# Patient Record
Sex: FEMALE | Race: WHITE | NOT HISPANIC OR LATINO | URBAN - METROPOLITAN AREA
[De-identification: names, ages, dates, MRNs, and addresses within clinical notes are randomized per-mention and may not be internally consistent; named-entity substitution may affect disease eponyms.]

---

## 2017-02-12 ENCOUNTER — EMERGENCY (EMERGENCY)
Facility: HOSPITAL | Age: 82
LOS: 1 days | Discharge: HOME | End: 2017-02-12
Attending: EMERGENCY MEDICINE | Admitting: EMERGENCY MEDICINE
Payer: MEDICARE

## 2017-02-12 VITALS
OXYGEN SATURATION: 99 % | RESPIRATION RATE: 18 BRPM | SYSTOLIC BLOOD PRESSURE: 152 MMHG | HEART RATE: 94 BPM | TEMPERATURE: 98 F | DIASTOLIC BLOOD PRESSURE: 93 MMHG

## 2017-02-12 VITALS
DIASTOLIC BLOOD PRESSURE: 78 MMHG | TEMPERATURE: 98 F | OXYGEN SATURATION: 100 % | HEART RATE: 94 BPM | SYSTOLIC BLOOD PRESSURE: 156 MMHG | RESPIRATION RATE: 18 BRPM

## 2017-02-12 LAB
ALBUMIN SERPL ELPH-MCNC: 4.1 G/DL — SIGNIFICANT CHANGE UP (ref 3.3–5)
ALP SERPL-CCNC: 66 U/L — SIGNIFICANT CHANGE UP (ref 40–120)
ALT FLD-CCNC: 14 U/L — SIGNIFICANT CHANGE UP (ref 4–33)
AMORPH CRY # UR COMP ASSIST: SIGNIFICANT CHANGE UP (ref 0–0)
APPEARANCE UR: SIGNIFICANT CHANGE UP
APTT BLD: 50.5 SEC — HIGH (ref 27.5–37.4)
AST SERPL-CCNC: 30 U/L — SIGNIFICANT CHANGE UP (ref 4–32)
BACTERIA # UR AUTO: SIGNIFICANT CHANGE UP
BASOPHILS # BLD AUTO: 0.01 K/UL — SIGNIFICANT CHANGE UP (ref 0–0.2)
BASOPHILS NFR BLD AUTO: 0.1 % — SIGNIFICANT CHANGE UP (ref 0–2)
BILIRUB SERPL-MCNC: 0.6 MG/DL — SIGNIFICANT CHANGE UP (ref 0.2–1.2)
BILIRUB UR-MCNC: NEGATIVE — SIGNIFICANT CHANGE UP
BLD GP AB SCN SERPL QL: NEGATIVE — SIGNIFICANT CHANGE UP
BLOOD UR QL VISUAL: NEGATIVE — SIGNIFICANT CHANGE UP
BUN SERPL-MCNC: 13 MG/DL — SIGNIFICANT CHANGE UP (ref 7–23)
CALCIUM SERPL-MCNC: 8.8 MG/DL — SIGNIFICANT CHANGE UP (ref 8.4–10.5)
CHLORIDE SERPL-SCNC: 103 MMOL/L — SIGNIFICANT CHANGE UP (ref 98–107)
CO2 SERPL-SCNC: 19 MMOL/L — LOW (ref 22–31)
COLOR SPEC: SIGNIFICANT CHANGE UP
CREAT SERPL-MCNC: 0.83 MG/DL — SIGNIFICANT CHANGE UP (ref 0.5–1.3)
EOSINOPHIL # BLD AUTO: 0.04 K/UL — SIGNIFICANT CHANGE UP (ref 0–0.5)
EOSINOPHIL NFR BLD AUTO: 0.5 % — SIGNIFICANT CHANGE UP (ref 0–6)
GLUCOSE SERPL-MCNC: 164 MG/DL — HIGH (ref 70–99)
GLUCOSE UR-MCNC: NEGATIVE — SIGNIFICANT CHANGE UP
HCT VFR BLD CALC: 40.5 % — SIGNIFICANT CHANGE UP (ref 34.5–45)
HGB BLD-MCNC: 13.7 G/DL — SIGNIFICANT CHANGE UP (ref 11.5–15.5)
HYALINE CASTS # UR AUTO: SIGNIFICANT CHANGE UP (ref 0–?)
IMM GRANULOCYTES NFR BLD AUTO: 0.3 % — SIGNIFICANT CHANGE UP (ref 0–1.5)
INR BLD: 1.27 — HIGH (ref 0.87–1.18)
KETONES UR-MCNC: NEGATIVE — SIGNIFICANT CHANGE UP
LEUKOCYTE ESTERASE UR-ACNC: HIGH
LYMPHOCYTES # BLD AUTO: 0.89 K/UL — LOW (ref 1–3.3)
LYMPHOCYTES # BLD AUTO: 12 % — LOW (ref 13–44)
MCHC RBC-ENTMCNC: 30.3 PG — SIGNIFICANT CHANGE UP (ref 27–34)
MCHC RBC-ENTMCNC: 33.8 % — SIGNIFICANT CHANGE UP (ref 32–36)
MCV RBC AUTO: 89.6 FL — SIGNIFICANT CHANGE UP (ref 80–100)
MONOCYTES # BLD AUTO: 0.61 K/UL — SIGNIFICANT CHANGE UP (ref 0–0.9)
MONOCYTES NFR BLD AUTO: 8.2 % — SIGNIFICANT CHANGE UP (ref 2–14)
MUCOUS THREADS # UR AUTO: SIGNIFICANT CHANGE UP
NEUTROPHILS # BLD AUTO: 5.83 K/UL — SIGNIFICANT CHANGE UP (ref 1.8–7.4)
NEUTROPHILS NFR BLD AUTO: 78.9 % — HIGH (ref 43–77)
NITRITE UR-MCNC: NEGATIVE — SIGNIFICANT CHANGE UP
NON-SQ EPI CELLS # UR AUTO: <1 — SIGNIFICANT CHANGE UP
PH UR: 6 — SIGNIFICANT CHANGE UP (ref 4.6–8)
PLATELET # BLD AUTO: 176 K/UL — SIGNIFICANT CHANGE UP (ref 150–400)
PMV BLD: 10.5 FL — SIGNIFICANT CHANGE UP (ref 7–13)
POTASSIUM SERPL-MCNC: 5.4 MMOL/L — HIGH (ref 3.5–5.3)
POTASSIUM SERPL-SCNC: 5.4 MMOL/L — HIGH (ref 3.5–5.3)
PROT SERPL-MCNC: 6.7 G/DL — SIGNIFICANT CHANGE UP (ref 6–8.3)
PROT UR-MCNC: 10 — SIGNIFICANT CHANGE UP
PROTHROM AB SERPL-ACNC: 14.5 SEC — HIGH (ref 10–13.1)
RBC # BLD: 4.52 M/UL — SIGNIFICANT CHANGE UP (ref 3.8–5.2)
RBC # FLD: 12.9 % — SIGNIFICANT CHANGE UP (ref 10.3–14.5)
RBC CASTS # UR COMP ASSIST: SIGNIFICANT CHANGE UP (ref 0–?)
RH IG SCN BLD-IMP: POSITIVE — SIGNIFICANT CHANGE UP
SODIUM SERPL-SCNC: 140 MMOL/L — SIGNIFICANT CHANGE UP (ref 135–145)
SP GR SPEC: 1.01 — SIGNIFICANT CHANGE UP (ref 1–1.03)
SQUAMOUS # UR AUTO: SIGNIFICANT CHANGE UP
TROPONIN T SERPL-MCNC: < 0.06 NG/ML — SIGNIFICANT CHANGE UP (ref 0–0.06)
UROBILINOGEN FLD QL: NORMAL E.U. — SIGNIFICANT CHANGE UP (ref 0.1–0.2)
WBC # BLD: 7.4 K/UL — SIGNIFICANT CHANGE UP (ref 3.8–10.5)
WBC # FLD AUTO: 7.4 K/UL — SIGNIFICANT CHANGE UP (ref 3.8–10.5)
WBC UR QL: SIGNIFICANT CHANGE UP (ref 0–?)
YEAST BUDDING # UR COMP ASSIST: SIGNIFICANT CHANGE UP

## 2017-02-12 PROCEDURE — 99284 EMERGENCY DEPT VISIT MOD MDM: CPT | Mod: 25,GC

## 2017-02-12 PROCEDURE — 70450 CT HEAD/BRAIN W/O DYE: CPT | Mod: 26

## 2017-02-12 PROCEDURE — 93010 ELECTROCARDIOGRAM REPORT: CPT

## 2017-02-12 PROCEDURE — 71010: CPT | Mod: 26

## 2017-02-12 RX ORDER — SODIUM CHLORIDE 9 MG/ML
1000 INJECTION INTRAMUSCULAR; INTRAVENOUS; SUBCUTANEOUS ONCE
Qty: 0 | Refills: 0 | Status: COMPLETED | OUTPATIENT
Start: 2017-02-12 | End: 2017-02-12

## 2017-02-12 RX ADMIN — SODIUM CHLORIDE 1000 MILLILITER(S): 9 INJECTION INTRAMUSCULAR; INTRAVENOUS; SUBCUTANEOUS at 12:02

## 2017-02-12 NOTE — ED PROVIDER NOTE - PROGRESS NOTE DETAILS
PT seen and reassessed.  Patient symptomatically improved.   AAOX3, NAD, VSS.  Discussed test results w/ patient, given copy of results. Patient verbalized understanding of hospital course and outpatient plans, has decisional making capacity.  Will follow-up with Primary care doctor in the next 5-7 days; patient will call for an appointment. Will return to the ED if there is any worsening of symptoms.  Patient able to ambulate w/o difficulty, is tolerating PO intake Discussed case with Dr. Carl Hagan, who examed the pt in the ED. he will follow up with the patient in the next 1-2 days and arranged outpatient care and rehabilitation. pt will stay with son tonight and follow up with Dr. Hagan tomorrow. pt is agreeable to plan.

## 2017-02-12 NOTE — ED ADULT NURSE REASSESSMENT NOTE - NS ED NURSE REASSESS COMMENT FT1
pt ambulated to bathroom with assistance, nad noted. urine sample obtained and sent to lab, safety maintained. cardiac monitor remains in place in nsr. will continue to observe
received report on pt from OMID SPENCER. pt presents awake a&ox4, appropriately answering questions. denies dizziness or ha at this time. neuro/sensory intact, no tremors noted. skin pink warm dry, appropriate for race. respirations even unlabored. lungs cta denies cp or sob. abdomen soft nontender nondistended. no active n/v/d. denies fevers or chills. IV site patent, clean, dry, intact. son at bedside, awaiting ct scan. will continue to observe

## 2017-02-12 NOTE — ED PROVIDER NOTE - CARE PLAN
Principal Discharge DX:	Tremor Principal Discharge DX:	Tremor  Instructions for follow-up, activity and diet:	1) Please follow-up with your primary care doctor in the next 1-2 days.  Please call tomorrow for an appointment.  If you cannot follow-up with your primary care doctor please return to the ED for any urgent issues.  2) You were given a copy of the tests performed today.  Please bring the results with you and review them with your primary care doctor.  3) If you have any worsening of symptoms or any other concerns please return to the ED immediately.  4) Please continue taking your home medications as directed.

## 2017-02-12 NOTE — ED PROVIDER NOTE - PMH
Atrial Fibrillation    DM (diabetes mellitus)    DVT (deep venous thrombosis)    Hyperlipemia    Hypertension

## 2017-02-12 NOTE — ED PROVIDER NOTE - MEDICAL DECISION MAKING DETAILS
82yo F hx of alzheimer, dm, htn, hld pw tremors and instability, worsening over 3 days. labs, ekg, reass.

## 2017-02-12 NOTE — ED PROVIDER NOTE - OBJECTIVE STATEMENT
81yoF hx of afib, on digoxin and metoprolol, htn, hld, dm, alzheimers on aricept (donepizal), pw with 3 days increased tremor and shakiness. progressiving worsening. basline walks with cane, but now unable to ambulate due to shaking. tremors are arms and head mostly, but minor in the legs.  endroses decrease po intake for a bout 1 week and worsening shorterm memory over last two weeks. no fevers, chills, chest pain, nausea, vomiting, dizziness, abd pain, dyruria.

## 2017-02-12 NOTE — ED PROVIDER NOTE - ATTENDING CONTRIBUTION TO CARE
82 yo F past medical history of Afib, on dig and metoprolol, Hypertension, hyperlipidemia, DM, and Alzheimer's on Aricept (donepezil), presents with 3 days increased tremor and difficulty ambulating. Patient has also experienced decreased PO intake and increased memory difficulties x 2 weeks.  DANETTE TAM, ATTENDING NOTE:  Patient is awake and alert and in no acute distress.  Normocephalic/atraumatic.  Auricles are normal.  Neck supple.  Lungs CTAB, no wheeze, no rhonchi,  no rales.  Heart is regular rate and rhythm.  Abdomen is soft, not distended +BS.  Back is nontender, no CVAT.  Moving all 4 extremities. Minimal resting tremor of hands.   Neurologically grossly intact.  Affect is mildly anxious.   DR. TAM, ATTENDING MD-  I performed a face to face bedside interview with patient regarding history of present illness, review of symptoms and past medical history. I completed an independent physical exam.  I have discussed patient's plan of care with Dr. Trevino.   I agree with note as stated above, having amended the EMR as needed to reflect my findings. I have discussed the assessment and plan of care.  This includes during the time I functioned as the attending physician for this patient. 82 yo F past medical history of Afib, on dig and metoprolol, Hypertension, hyperlipidemia, DM, and Alzheimer's on Aricept (donepezil), presents with 3 days increased tremor and difficulty ambulating. Patient has also experienced decreased PO intake and increased memory difficulties x 2 weeks.  DANETTE TAM, ATTENDING NOTE:  Patient is awake and alert and in no acute distress.  Normocephalic/atraumatic.  Auricles are normal.  Neck supple.  Lungs CTAB, no wheeze, no rhonchi,  no rales.  Heart is regular rate and rhythm.  Abdomen is soft, not distended +BS.  Back is nontender, no CVAT.  Moving all 4 extremities. Minimal resting tremor of hands.   Neurologically grossly intact.  Affect is mildly anxious.   DR. TAM, ATTENDING MD-  I performed a face to face bedside interview with patient regarding history of present illness, review of symptoms and past medical history. I completed an independent physical exam.  I have discussed patient's plan of care with Dr. Trevino.   I agree with note as stated above, having amended the EMR as needed to reflect my findings. I have discussed the assessment and plan of care.  This includes during the time I functioned as the attending physician for this patient.  UA noted to be dirty catch.  Will follow up cx results.

## 2017-02-12 NOTE — ED ADULT TRIAGE NOTE - CHIEF COMPLAINT QUOTE
shaking,   tremors to body x past 4 days with loss of balance ,dizziness. .  saw md dover,labs sent.  denies pain, speech clear.  states increasing diff breathing upon exertion x past 4 days. denies cough shaking,   tremors to body x past 4 days with loss of balance ,dizziness. .  saw md dover,labs sent.  denies pain, speech clear.  states increasing diff breathing upon exertion x past 4 days. denies cough  fs 169 pmh aicd,a fib.  htn, takes pradaxa ,dm ,. presently taking aricept

## 2017-02-12 NOTE — ED PROVIDER NOTE - PLAN OF CARE
1) Please follow-up with your primary care doctor in the next 1-2 days.  Please call tomorrow for an appointment.  If you cannot follow-up with your primary care doctor please return to the ED for any urgent issues.  2) You were given a copy of the tests performed today.  Please bring the results with you and review them with your primary care doctor.  3) If you have any worsening of symptoms or any other concerns please return to the ED immediately.  4) Please continue taking your home medications as directed.

## 2017-02-13 LAB — SPECIMEN SOURCE: SIGNIFICANT CHANGE UP

## 2017-02-14 LAB
-  AMIKACIN: SIGNIFICANT CHANGE UP
-  AMPICILLIN/SULBACTAM: SIGNIFICANT CHANGE UP
-  AMPICILLIN: SIGNIFICANT CHANGE UP
-  AZTREONAM: SIGNIFICANT CHANGE UP
-  CEFAZOLIN: SIGNIFICANT CHANGE UP
-  CEFEPIME: SIGNIFICANT CHANGE UP
-  CEFOXITIN: SIGNIFICANT CHANGE UP
-  CEFTAZIDIME: SIGNIFICANT CHANGE UP
-  CEFTRIAXONE: SIGNIFICANT CHANGE UP
-  CIPROFLOXACIN: SIGNIFICANT CHANGE UP
-  ERTAPENEM: SIGNIFICANT CHANGE UP
-  GENTAMICIN: SIGNIFICANT CHANGE UP
-  IMIPENEM: SIGNIFICANT CHANGE UP
-  LEVOFLOXACIN: SIGNIFICANT CHANGE UP
-  MEROPENEM: SIGNIFICANT CHANGE UP
-  NITROFURANTOIN: SIGNIFICANT CHANGE UP
-  PIPERACILLIN/TAZOBACTAM: SIGNIFICANT CHANGE UP
-  TIGECYCLINE: SIGNIFICANT CHANGE UP
-  TOBRAMYCIN: SIGNIFICANT CHANGE UP
-  TRIMETHOPRIM/SULFAMETHOXAZOLE: SIGNIFICANT CHANGE UP
BACTERIA UR CULT: SIGNIFICANT CHANGE UP
METHOD TYPE: SIGNIFICANT CHANGE UP
ORGANISM # SPEC MICROSCOPIC CNT: SIGNIFICANT CHANGE UP

## 2017-02-14 NOTE — ED POST DISCHARGE NOTE - RESULT SUMMARY
UCX gram negative rods 10-49,000 and cornybacteria 10-49,000 prelim. No abx listed in prescription writer. Pt contacted [phone] no answer. Plan to call patient see how feeling, pt was to followup with Dr. Hagan 1-2 post discharge. Will follow ucx to final

## 2017-03-02 ENCOUNTER — APPOINTMENT (OUTPATIENT)
Dept: ELECTROPHYSIOLOGY | Facility: CLINIC | Age: 82
End: 2017-03-02

## 2017-06-15 ENCOUNTER — APPOINTMENT (OUTPATIENT)
Dept: ELECTROPHYSIOLOGY | Facility: CLINIC | Age: 82
End: 2017-06-15

## 2017-09-21 ENCOUNTER — APPOINTMENT (OUTPATIENT)
Dept: ELECTROPHYSIOLOGY | Facility: CLINIC | Age: 82
End: 2017-09-21
Payer: MEDICARE

## 2017-09-21 PROCEDURE — 93294 REM INTERROG EVL PM/LDLS PM: CPT

## 2017-09-21 PROCEDURE — 93296 REM INTERROG EVL PM/IDS: CPT

## 2017-12-29 ENCOUNTER — APPOINTMENT (OUTPATIENT)
Dept: ELECTROPHYSIOLOGY | Facility: CLINIC | Age: 82
End: 2017-12-29

## 2018-04-10 ENCOUNTER — APPOINTMENT (OUTPATIENT)
Dept: ELECTROPHYSIOLOGY | Facility: CLINIC | Age: 83
End: 2018-04-10
Payer: MEDICARE

## 2018-04-10 PROCEDURE — 93294 REM INTERROG EVL PM/LDLS PM: CPT

## 2018-04-10 PROCEDURE — 93296 REM INTERROG EVL PM/IDS: CPT

## 2018-07-10 ENCOUNTER — APPOINTMENT (OUTPATIENT)
Dept: ELECTROPHYSIOLOGY | Facility: CLINIC | Age: 83
End: 2018-07-10
Payer: MEDICARE

## 2018-07-10 DIAGNOSIS — Z86.79 PERSONAL HISTORY OF OTHER DISEASES OF THE CIRCULATORY SYSTEM: ICD-10-CM

## 2018-07-10 PROCEDURE — 93296 REM INTERROG EVL PM/IDS: CPT

## 2018-07-10 PROCEDURE — 93294 REM INTERROG EVL PM/LDLS PM: CPT

## 2018-12-10 ENCOUNTER — NON-APPOINTMENT (OUTPATIENT)
Age: 83
End: 2018-12-10

## 2018-12-10 ENCOUNTER — APPOINTMENT (OUTPATIENT)
Dept: ELECTROPHYSIOLOGY | Facility: CLINIC | Age: 83
End: 2018-12-10
Payer: MEDICARE

## 2018-12-10 VITALS — SYSTOLIC BLOOD PRESSURE: 161 MMHG | DIASTOLIC BLOOD PRESSURE: 58 MMHG | HEART RATE: 62 BPM

## 2018-12-10 DIAGNOSIS — Z87.891 PERSONAL HISTORY OF NICOTINE DEPENDENCE: ICD-10-CM

## 2018-12-10 PROCEDURE — 99205 OFFICE O/P NEW HI 60 MIN: CPT

## 2018-12-10 PROCEDURE — 93000 ELECTROCARDIOGRAM COMPLETE: CPT | Mod: 59

## 2018-12-10 PROCEDURE — 93280 PM DEVICE PROGR EVAL DUAL: CPT

## 2018-12-10 RX ORDER — DONEPEZIL HYDROCHLORIDE 10 MG/1
10 TABLET, FILM COATED ORAL DAILY
Refills: 0 | Status: ACTIVE | COMMUNITY

## 2018-12-10 NOTE — REASON FOR VISIT
[Initial Evaluation] : an initial evaluation of [Pacemaker Evaluation] : pacemaker ~T evaluation ~C was performed

## 2018-12-19 NOTE — HISTORY OF PRESENT ILLNESS
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 Admits doing well with no issues or complaints. Denies chest pain, palpitations, SOB, syncope or near syncope.

## 2018-12-19 NOTE — PHYSICAL EXAM
[General Appearance - Well Developed] : well developed [Normal Appearance] : normal appearance [Well Groomed] : well groomed [General Appearance - Well Nourished] : well nourished [No Deformities] : no deformities [General Appearance - In No Acute Distress] : no acute distress [Normal Conjunctiva] : the conjunctiva exhibited no abnormalities [Eyelids - No Xanthelasma] : the eyelids demonstrated no xanthelasmas [Normal Oral Mucosa] : normal oral mucosa [No Oral Pallor] : no oral pallor [No Oral Cyanosis] : no oral cyanosis [Normal Jugular Venous A Waves Present] : normal jugular venous A waves present [Normal Jugular Venous V Waves Present] : normal jugular venous V waves present [No Jugular Venous Madrigal A Waves] : no jugular venous madrigal A waves [Heart Rate And Rhythm] : heart rate and rhythm were normal [Heart Sounds] : normal S1 and S2 [Respiration, Rhythm And Depth] : normal respiratory rhythm and effort [Exaggerated Use Of Accessory Muscles For Inspiration] : no accessory muscle use [Auscultation Breath Sounds / Voice Sounds] : lungs were clear to auscultation bilaterally [Abdomen Soft] : soft [Abdomen Tenderness] : non-tender [Abdomen Mass (___ Cm)] : no abdominal mass palpated [Abnormal Walk] : normal gait [Gait - Sufficient For Exercise Testing] : the gait was sufficient for exercise testing [Nail Clubbing] : no clubbing of the fingernails [Cyanosis, Localized] : no localized cyanosis [Petechial Hemorrhages (___cm)] : no petechial hemorrhages [Skin Color & Pigmentation] : normal skin color and pigmentation [] : no rash [No Venous Stasis] : no venous stasis [Skin Lesions] : no skin lesions [No Skin Ulcers] : no skin ulcer [No Xanthoma] : no  xanthoma was observed [Oriented To Time, Place, And Person] : oriented to person, place, and time [Affect] : the affect was normal [Mood] : the mood was normal [No Anxiety] : not feeling anxious [FreeTextEntry1] : murmur

## 2018-12-19 NOTE — DISCUSSION/SUMMARY
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 Device check performed today revealed device in good working status with adequate pacing/sensing thresholds. Episodes of far field sensing noted as well as episodes of SVT/paf with RVR noted as well as NSVT, asymptomatic of all. Resume metoprolol tart 50mg BID and digoxin 125 mcg daily as prescribed. Will continue regular f/u in device and remote monitoring as scheduled. \par \par 2. Persistent atrial fibrillation: EKG today shows atrial fibrillation with ventricular pacing. Resume rate control management and Pradaxa for thromboembolic prophylaxis. \par \par 3. HTN: BP elevated in office today. Resume oral antihypertensives. Encouraged heart healthy diet and sodium restriction,. Will f/u with Cardiologist for further HTN management. \par \par May RTO for f/u with device in 6 months.

## 2019-03-28 ENCOUNTER — APPOINTMENT (OUTPATIENT)
Dept: ELECTROPHYSIOLOGY | Facility: CLINIC | Age: 84
End: 2019-03-28

## 2019-04-12 ENCOUNTER — MESSAGE (OUTPATIENT)
Age: 84
End: 2019-04-12

## 2019-07-01 ENCOUNTER — APPOINTMENT (OUTPATIENT)
Dept: ELECTROPHYSIOLOGY | Facility: CLINIC | Age: 84
End: 2019-07-01

## 2019-07-05 ENCOUNTER — INPATIENT (INPATIENT)
Facility: HOSPITAL | Age: 84
LOS: 2 days | Discharge: HOME HEALTH | End: 2019-07-08
Attending: INTERNAL MEDICINE | Admitting: INTERNAL MEDICINE
Payer: MEDICARE

## 2019-07-05 ENCOUNTER — EMERGENCY (EMERGENCY)
Facility: HOSPITAL | Age: 84
LOS: 1 days | Discharge: LEFT BEFORE DISCHARGE | End: 2019-07-05
Attending: EMERGENCY MEDICINE | Admitting: EMERGENCY MEDICINE
Payer: MEDICARE

## 2019-07-05 VITALS
HEART RATE: 85 BPM | DIASTOLIC BLOOD PRESSURE: 67 MMHG | SYSTOLIC BLOOD PRESSURE: 124 MMHG | WEIGHT: 138.01 LBS | RESPIRATION RATE: 16 BRPM | OXYGEN SATURATION: 98 % | TEMPERATURE: 98 F

## 2019-07-05 VITALS
TEMPERATURE: 98 F | HEART RATE: 75 BPM | SYSTOLIC BLOOD PRESSURE: 98 MMHG | OXYGEN SATURATION: 97 % | RESPIRATION RATE: 18 BRPM | DIASTOLIC BLOOD PRESSURE: 50 MMHG

## 2019-07-05 LAB
ALBUMIN SERPL ELPH-MCNC: 3.2 G/DL — LOW (ref 3.3–5)
ALBUMIN SERPL ELPH-MCNC: 3.5 G/DL — SIGNIFICANT CHANGE UP (ref 3.3–5)
ALP SERPL-CCNC: 77 U/L — SIGNIFICANT CHANGE UP (ref 40–120)
ALP SERPL-CCNC: 84 U/L — SIGNIFICANT CHANGE UP (ref 40–120)
ALT FLD-CCNC: 11 U/L — SIGNIFICANT CHANGE UP (ref 4–33)
ALT FLD-CCNC: 12 U/L — SIGNIFICANT CHANGE UP (ref 4–33)
ANION GAP SERPL CALC-SCNC: 12 MMO/L — SIGNIFICANT CHANGE UP (ref 7–14)
ANION GAP SERPL CALC-SCNC: 14 MMO/L — SIGNIFICANT CHANGE UP (ref 7–14)
AST SERPL-CCNC: 20 U/L — SIGNIFICANT CHANGE UP (ref 4–32)
AST SERPL-CCNC: 33 U/L — HIGH (ref 4–32)
BASE EXCESS BLDV CALC-SCNC: 3.1 MMOL/L — SIGNIFICANT CHANGE UP
BASOPHILS # BLD AUTO: 0.02 K/UL — SIGNIFICANT CHANGE UP (ref 0–0.2)
BASOPHILS # BLD AUTO: 0.03 K/UL — SIGNIFICANT CHANGE UP (ref 0–0.2)
BASOPHILS NFR BLD AUTO: 0.2 % — SIGNIFICANT CHANGE UP (ref 0–2)
BASOPHILS NFR BLD AUTO: 0.4 % — SIGNIFICANT CHANGE UP (ref 0–2)
BILIRUB SERPL-MCNC: 0.2 MG/DL — SIGNIFICANT CHANGE UP (ref 0.2–1.2)
BILIRUB SERPL-MCNC: < 0.2 MG/DL — LOW (ref 0.2–1.2)
BLOOD GAS VENOUS - CREATININE: 1.11 MG/DL — SIGNIFICANT CHANGE UP (ref 0.5–1.3)
BUN SERPL-MCNC: 15 MG/DL — SIGNIFICANT CHANGE UP (ref 7–23)
BUN SERPL-MCNC: 19 MG/DL — SIGNIFICANT CHANGE UP (ref 7–23)
CALCIUM SERPL-MCNC: 8.8 MG/DL — SIGNIFICANT CHANGE UP (ref 8.4–10.5)
CALCIUM SERPL-MCNC: 8.9 MG/DL — SIGNIFICANT CHANGE UP (ref 8.4–10.5)
CHLORIDE BLDV-SCNC: 106 MMOL/L — SIGNIFICANT CHANGE UP (ref 96–108)
CHLORIDE SERPL-SCNC: 102 MMOL/L — SIGNIFICANT CHANGE UP (ref 98–107)
CHLORIDE SERPL-SCNC: 104 MMOL/L — SIGNIFICANT CHANGE UP (ref 98–107)
CO2 SERPL-SCNC: 22 MMOL/L — SIGNIFICANT CHANGE UP (ref 22–31)
CO2 SERPL-SCNC: 24 MMOL/L — SIGNIFICANT CHANGE UP (ref 22–31)
CREAT SERPL-MCNC: 0.95 MG/DL — SIGNIFICANT CHANGE UP (ref 0.5–1.3)
CREAT SERPL-MCNC: 0.99 MG/DL — SIGNIFICANT CHANGE UP (ref 0.5–1.3)
EOSINOPHIL # BLD AUTO: 0.12 K/UL — SIGNIFICANT CHANGE UP (ref 0–0.5)
EOSINOPHIL # BLD AUTO: 0.13 K/UL — SIGNIFICANT CHANGE UP (ref 0–0.5)
EOSINOPHIL NFR BLD AUTO: 1.5 % — SIGNIFICANT CHANGE UP (ref 0–6)
EOSINOPHIL NFR BLD AUTO: 1.5 % — SIGNIFICANT CHANGE UP (ref 0–6)
GAS PNL BLDV: 134 MMOL/L — LOW (ref 136–146)
GLUCOSE BLDV-MCNC: 118 MG/DL — HIGH (ref 70–99)
GLUCOSE SERPL-MCNC: 127 MG/DL — HIGH (ref 70–99)
GLUCOSE SERPL-MCNC: 87 MG/DL — SIGNIFICANT CHANGE UP (ref 70–99)
HCO3 BLDV-SCNC: 26 MMOL/L — SIGNIFICANT CHANGE UP (ref 20–27)
HCT VFR BLD CALC: 32.7 % — LOW (ref 34.5–45)
HCT VFR BLD CALC: 35 % — SIGNIFICANT CHANGE UP (ref 34.5–45)
HCT VFR BLDV CALC: 29.9 % — LOW (ref 34.5–45)
HGB BLD-MCNC: 10.3 G/DL — LOW (ref 11.5–15.5)
HGB BLD-MCNC: 9.5 G/DL — LOW (ref 11.5–15.5)
HGB BLDV-MCNC: 9.7 G/DL — LOW (ref 11.5–15.5)
IMM GRANULOCYTES NFR BLD AUTO: 0.5 % — SIGNIFICANT CHANGE UP (ref 0–1.5)
IMM GRANULOCYTES NFR BLD AUTO: 0.6 % — SIGNIFICANT CHANGE UP (ref 0–1.5)
LACTATE BLDV-MCNC: 2 MMOL/L — SIGNIFICANT CHANGE UP (ref 0.5–2)
LYMPHOCYTES # BLD AUTO: 1.27 K/UL — SIGNIFICANT CHANGE UP (ref 1–3.3)
LYMPHOCYTES # BLD AUTO: 1.27 K/UL — SIGNIFICANT CHANGE UP (ref 1–3.3)
LYMPHOCYTES # BLD AUTO: 14.3 % — SIGNIFICANT CHANGE UP (ref 13–44)
LYMPHOCYTES # BLD AUTO: 16.2 % — SIGNIFICANT CHANGE UP (ref 13–44)
MAGNESIUM SERPL-MCNC: 2 MG/DL — SIGNIFICANT CHANGE UP (ref 1.6–2.6)
MCHC RBC-ENTMCNC: 25.6 PG — LOW (ref 27–34)
MCHC RBC-ENTMCNC: 25.9 PG — LOW (ref 27–34)
MCHC RBC-ENTMCNC: 29.1 % — LOW (ref 32–36)
MCHC RBC-ENTMCNC: 29.4 % — LOW (ref 32–36)
MCV RBC AUTO: 87.9 FL — SIGNIFICANT CHANGE UP (ref 80–100)
MCV RBC AUTO: 88.1 FL — SIGNIFICANT CHANGE UP (ref 80–100)
MONOCYTES # BLD AUTO: 0.75 K/UL — SIGNIFICANT CHANGE UP (ref 0–0.9)
MONOCYTES # BLD AUTO: 0.87 K/UL — SIGNIFICANT CHANGE UP (ref 0–0.9)
MONOCYTES NFR BLD AUTO: 9.6 % — SIGNIFICANT CHANGE UP (ref 2–14)
MONOCYTES NFR BLD AUTO: 9.8 % — SIGNIFICANT CHANGE UP (ref 2–14)
NEUTROPHILS # BLD AUTO: 5.63 K/UL — SIGNIFICANT CHANGE UP (ref 1.8–7.4)
NEUTROPHILS # BLD AUTO: 6.55 K/UL — SIGNIFICANT CHANGE UP (ref 1.8–7.4)
NEUTROPHILS NFR BLD AUTO: 71.8 % — SIGNIFICANT CHANGE UP (ref 43–77)
NEUTROPHILS NFR BLD AUTO: 73.6 % — SIGNIFICANT CHANGE UP (ref 43–77)
NRBC # FLD: 0 K/UL — SIGNIFICANT CHANGE UP (ref 0–0)
NRBC # FLD: 0 K/UL — SIGNIFICANT CHANGE UP (ref 0–0)
NT-PROBNP SERPL-SCNC: 4332 PG/ML — SIGNIFICANT CHANGE UP
PCO2 BLDV: 49 MMHG — SIGNIFICANT CHANGE UP (ref 41–51)
PH BLDV: 7.37 PH — SIGNIFICANT CHANGE UP (ref 7.32–7.43)
PHOSPHATE SERPL-MCNC: 3.1 MG/DL — SIGNIFICANT CHANGE UP (ref 2.5–4.5)
PLATELET # BLD AUTO: 230 K/UL — SIGNIFICANT CHANGE UP (ref 150–400)
PLATELET # BLD AUTO: 265 K/UL — SIGNIFICANT CHANGE UP (ref 150–400)
PMV BLD: 10.7 FL — SIGNIFICANT CHANGE UP (ref 7–13)
PMV BLD: 10.9 FL — SIGNIFICANT CHANGE UP (ref 7–13)
PO2 BLDV: 44 MMHG — HIGH (ref 35–40)
POTASSIUM BLDV-SCNC: 5 MMOL/L — HIGH (ref 3.4–4.5)
POTASSIUM SERPL-MCNC: 4.4 MMOL/L — SIGNIFICANT CHANGE UP (ref 3.5–5.3)
POTASSIUM SERPL-MCNC: 5.6 MMOL/L — HIGH (ref 3.5–5.3)
POTASSIUM SERPL-SCNC: 4.4 MMOL/L — SIGNIFICANT CHANGE UP (ref 3.5–5.3)
POTASSIUM SERPL-SCNC: 5.6 MMOL/L — HIGH (ref 3.5–5.3)
PROT SERPL-MCNC: 6 G/DL — SIGNIFICANT CHANGE UP (ref 6–8.3)
PROT SERPL-MCNC: 6.3 G/DL — SIGNIFICANT CHANGE UP (ref 6–8.3)
RBC # BLD: 3.71 M/UL — LOW (ref 3.8–5.2)
RBC # BLD: 3.98 M/UL — SIGNIFICANT CHANGE UP (ref 3.8–5.2)
RBC # FLD: 13.4 % — SIGNIFICANT CHANGE UP (ref 10.3–14.5)
RBC # FLD: 13.5 % — SIGNIFICANT CHANGE UP (ref 10.3–14.5)
SAO2 % BLDV: 74.3 % — SIGNIFICANT CHANGE UP (ref 60–85)
SODIUM SERPL-SCNC: 138 MMOL/L — SIGNIFICANT CHANGE UP (ref 135–145)
SODIUM SERPL-SCNC: 140 MMOL/L — SIGNIFICANT CHANGE UP (ref 135–145)
TROPONIN T, HIGH SENSITIVITY: 53 NG/L — CRITICAL HIGH (ref ?–14)
TROPONIN T, HIGH SENSITIVITY: 58 NG/L — CRITICAL HIGH (ref ?–14)
WBC # BLD: 7.84 K/UL — SIGNIFICANT CHANGE UP (ref 3.8–10.5)
WBC # BLD: 8.89 K/UL — SIGNIFICANT CHANGE UP (ref 3.8–10.5)
WBC # FLD AUTO: 7.84 K/UL — SIGNIFICANT CHANGE UP (ref 3.8–10.5)
WBC # FLD AUTO: 8.89 K/UL — SIGNIFICANT CHANGE UP (ref 3.8–10.5)

## 2019-07-05 PROCEDURE — 99284 EMERGENCY DEPT VISIT MOD MDM: CPT | Mod: 25,GC

## 2019-07-05 PROCEDURE — 93010 ELECTROCARDIOGRAM REPORT: CPT

## 2019-07-05 PROCEDURE — 71046 X-RAY EXAM CHEST 2 VIEWS: CPT | Mod: 26

## 2019-07-05 RX ORDER — FUROSEMIDE 40 MG
40 TABLET ORAL ONCE
Refills: 0 | Status: COMPLETED | OUTPATIENT
Start: 2019-07-05 | End: 2019-07-05

## 2019-07-05 RX ORDER — SPIRONOLACTONE 25 MG/1
1 TABLET, FILM COATED ORAL
Qty: 14 | Refills: 0
Start: 2019-07-05 | End: 2019-07-18

## 2019-07-05 RX ADMIN — Medication 40 MILLIGRAM(S): at 23:03

## 2019-07-05 NOTE — ED PROVIDER NOTE - PROGRESS NOTE DETAILS
Pt does not want to stay for lab results. Daughter is here with the patient. Both understand risks of decompensated CHF, possible electrolyte abnormality, ACS. Would like to go home and f/u with her doctor as an outpatient.  Will AMA and refill spironolactone. Khanh Osman, PGY-2: saw patient, lungs clear, bilateral pitting edema, states that she feels fine and her daughter made her come, no SOB, no chest pain. Van: lab called with eleevated trop level to 53, also BNP elevated. No priors for comparison. Pt AMA'd earlier. I left a message for her daughter to bring the patient back. I attempted to call pt's home number but there is no answer and mailbox is full.

## 2019-07-05 NOTE — ED PROVIDER NOTE - ATTENDING CONTRIBUTION TO CARE
I performed a face-to-face evaluation of the patient and performed a history and physical examination. I agree with the history and physical examination. I performed a face-to-face evaluation of the patient and performed a history and physical examination. I agree with the history and physical examination.    Coty: Likely CHF (ran out of diuretic). Elevated trop may be 2/2 CHF vs. ACS (re-check trop to trend). EKG changes suspicious for ACS. Likely admit. I performed a face-to-face evaluation of the patient and performed a history and physical examination. I agree with the history and physical examination.    Coty: Likely a. fib (Pradaxa), CHF (ran out of diuretic). Elevated trop may be 2/2 CHF vs. ACS (re-check trop to trend). EKG changes suspicious for ACS. Likely admit.

## 2019-07-05 NOTE — ED PROVIDER NOTE - CLINICAL SUMMARY MEDICAL DECISION MAKING FREE TEXT BOX
Coty: Likely CHF (ran out of diuretic). Elevated trop may be 2/2 CHF vs. ACS (re-check trop to trend). EKG changes suspicious for ACS. Likely admit. Coty: Likely a. fib (Pradaxa), CHF (ran out of diuretic). Elevated trop may be 2/2 CHF vs. ACS (re-check trop to trend). EKG changes suspicious for ACS. Likely admit.

## 2019-07-05 NOTE — ED PROVIDER NOTE - CLINICAL SUMMARY MEDICAL DECISION MAKING FREE TEXT BOX
Van: Elderly woman with multiple medical problems, including CHF p/w 3 days of leg swelling. Well appearing. Stable vitals, clear lungs. likely mild CHF exacerbation, especially in setting of running out of spironolactone. Will r/o DORINA, electrolyte abnormality,  ACS. Do not suspect cellulitis or DVT. Plan for labs, including BNP, CXR, d/w Dr. Hagan, likely diuresis.

## 2019-07-05 NOTE — ED ADULT NURSE NOTE - CHIEF COMPLAINT QUOTE
pt. seen at [facility] earlier today for edema in the lower extremities and 3.5 lb weight gain since yesterday, AMA'd but rec'd a phone call back advising her to come back to the ED d/t elevated troponin levels. Pt. denies CP/SOB. PMHx CHF, DVT, Afib, HTN, HLD.

## 2019-07-05 NOTE — ED PROVIDER NOTE - OBJECTIVE STATEMENT
Coty: 83 F, seen here earlier for (B) LE swelling, CHF, DVT. Left AMA. Trop returned 53; called back. In the interim, EKG changed from TWI in lateral leads to upright T waves in lateral leads. Coty: 83 F, seen here earlier for (B) LE swelling 3-4 days. Ran out of Aldactone (last dose 2 days ago). H/o pacer, HL, DM, CHF, DVT. Left AMA. Trop returned 53; called back. In the interim, EKG changed from TWI in lateral leads to upright T waves in lateral leads. Coty: 83 F, seen here earlier for (B) LE swelling 3-4 days. Ran out of Aldactone (last dose 2 days ago). H/o pacer, HL, DM, CHF, DVT. Left AMA. Trop returned 53; called back. In the interim, EKG changed from TWI in lateral leads to upright T waves in lateral leads.    Theodora = daughter: [phone]

## 2019-07-05 NOTE — ED PROVIDER NOTE - NSFOLLOWUPINSTRUCTIONS_ED_ALL_ED_FT
Please see Dr. Hagan tomorrow. Take all your medicines as prescribed. Also a refill for spironolactone was sent. Come back to ED any time to complete your work up. Return if you develop trouble breathing, chest pain, fever, worsening leg swelling or redness.

## 2019-07-05 NOTE — ED PROVIDER NOTE - OBJECTIVE STATEMENT
83F with pmh of HTN, DM, PPM, CHF, DVT, a fib on Pradaxa and Lasix, recent admission for leg cellulitis p/w worsening bilateral leg swelling x 3 days. Denies cp, sob, f/c, lightheadedness, cough. Pt ran out of spironolactone 2 days ago but has been taking all her other meds. Pt is accompanied by her daughter. Cardiologist: Dr. Hagan.

## 2019-07-05 NOTE — ED PROVIDER NOTE - ATTENDING CONTRIBUTION TO CARE
Dr. Araujo: I have personally performed a face to face bedside history and physical examination of this patient. I have discussed the history, examination, review of systems, assessment and plan of management with the resident. I have reviewed the electronic medical record and amended it to reflect my history, review of systems, physical exam, assessment and plan.    see chart

## 2019-07-05 NOTE — ED PROVIDER NOTE - PHYSICAL EXAMINATION
Well appearing, well nourished, awake, alert, oriented to person, place, time/situation and in no apparent distress.    Airway patent    Eyes without scleral injection. No jaundice.    Strong pulse.    Respirations unlabored.    Abdomen soft, non-tender, no guarding.    Spine appears normal, range of motion is not limited, no muscle or joint tenderness    Alert and oriented, no gross motor or sensory deficits.    Skin normal color for race, warm, dry and intact. No evidence of rash.    No SI/HI. Well appearing, well nourished, awake, alert, oriented to person, place, time/situation and in no apparent distress.    Airway patent    Eyes without scleral injection. No jaundice.    Strong pulse. No M/R/G.    Respirations unlabored. Lungs clear.    Abdomen soft, non-tender, no guarding.    Spine appears normal, range of motion is not limited, no muscle or joint tenderness. 2-3+ (B) LE pitting edema.    Alert and oriented, no gross motor or sensory deficits.    Skin normal color for race, warm, dry and intact. No evidence of rash.    No SI/HI.

## 2019-07-05 NOTE — ED ADULT NURSE NOTE - OBJECTIVE STATEMENT
Pt received in spot 25, A&OX4, NAD.  c/o B/L lower extremity edema for the past 3 days.  Pt states her daughter made her come in, does not feel that the swelling is any worse than normal.  Denies any SOB/chest pain/palpitations.  Mild swelling noted to B/L lower extremities, no erythema noted.  Labs sent.  Will continue to monitor.

## 2019-07-05 NOTE — ED PROVIDER NOTE - PHYSICAL EXAMINATION
GEN - NAD; well appearing; A+O x3   HEAD - NC/AT     EYES - EOMI, no conjunctival pallor, no scleral icterus  ENT -   mucous membranes  moist , no discharge      NECK - Neck supple  PULM - CTA b/l,  symmetric breath sounds  COR -  RRR, S1 S2, + murmur, +PPM. equal radial and DP pulses.  ABD - , ND, NT, soft, no guarding, no rebound, no masses    BACK - no CVA tenderness, nontender spine     EXTREMS - bilateral symmetric LE edema, no calf tenderenss, no deformity, warm and well perfused    SKIN - no rash or bruising      NEUROLOGIC - alert, sensation nl, motor 5/5 RUE/LUE/RLE/LLE

## 2019-07-05 NOTE — ED ADULT NURSE NOTE - OBJECTIVE STATEMENT
Susannah RN:  Pt received in spot Trauma C, A&OX4, NAD.  c/o callback for elevated troponin.  Pt was originally seen in ED earlier today for B/L lower extremity swelling for the past 3-4 days.  Denies any chest pain/SOB/palpitations.  Pt denies any dizziness/nausea or any other physical complaints.  Labs sent.  IVL placed, 22g to L hand.  Report given to primary RN.

## 2019-07-05 NOTE — ED ADULT TRIAGE NOTE - CHIEF COMPLAINT QUOTE
pt. seen at [facility] earlier today for edema in the lower extremities and 3.5 lb weight gain since yesterday, AMA'd but rec'd a phone call back advising her to come back to the ED d/t elevated troponin levels. Pt. denies CP/SOB. PMHx CHF, DVT, Afib, HTN, HLD.

## 2019-07-06 DIAGNOSIS — D64.9 ANEMIA, UNSPECIFIED: ICD-10-CM

## 2019-07-06 DIAGNOSIS — I48.0 PAROXYSMAL ATRIAL FIBRILLATION: ICD-10-CM

## 2019-07-06 DIAGNOSIS — Z29.9 ENCOUNTER FOR PROPHYLACTIC MEASURES, UNSPECIFIED: ICD-10-CM

## 2019-07-06 DIAGNOSIS — E11.65 TYPE 2 DIABETES MELLITUS WITH HYPERGLYCEMIA: ICD-10-CM

## 2019-07-06 DIAGNOSIS — I50.33 ACUTE ON CHRONIC DIASTOLIC (CONGESTIVE) HEART FAILURE: ICD-10-CM

## 2019-07-06 DIAGNOSIS — I21.4 NON-ST ELEVATION (NSTEMI) MYOCARDIAL INFARCTION: ICD-10-CM

## 2019-07-06 DIAGNOSIS — E78.5 HYPERLIPIDEMIA, UNSPECIFIED: ICD-10-CM

## 2019-07-06 DIAGNOSIS — I10 ESSENTIAL (PRIMARY) HYPERTENSION: ICD-10-CM

## 2019-07-06 LAB
ANION GAP SERPL CALC-SCNC: 17 MMO/L — HIGH (ref 7–14)
BUN SERPL-MCNC: 18 MG/DL — SIGNIFICANT CHANGE UP (ref 7–23)
CALCIUM SERPL-MCNC: 9 MG/DL — SIGNIFICANT CHANGE UP (ref 8.4–10.5)
CHLORIDE SERPL-SCNC: 100 MMOL/L — SIGNIFICANT CHANGE UP (ref 98–107)
CHOLEST SERPL-MCNC: 208 MG/DL — HIGH (ref 120–199)
CK MB BLD-MCNC: 3.38 NG/ML — SIGNIFICANT CHANGE UP (ref 1–4.7)
CK SERPL-CCNC: 120 U/L — SIGNIFICANT CHANGE UP (ref 25–170)
CO2 SERPL-SCNC: 23 MMOL/L — SIGNIFICANT CHANGE UP (ref 22–31)
CREAT SERPL-MCNC: 0.93 MG/DL — SIGNIFICANT CHANGE UP (ref 0.5–1.3)
DIGOXIN SERPL-MCNC: 1 NG/ML — SIGNIFICANT CHANGE UP (ref 0.8–2)
GLUCOSE BLDC GLUCOMTR-MCNC: 132 MG/DL — HIGH (ref 70–99)
GLUCOSE BLDC GLUCOMTR-MCNC: 144 MG/DL — HIGH (ref 70–99)
GLUCOSE BLDC GLUCOMTR-MCNC: 161 MG/DL — HIGH (ref 70–99)
GLUCOSE BLDC GLUCOMTR-MCNC: 188 MG/DL — HIGH (ref 70–99)
GLUCOSE SERPL-MCNC: 134 MG/DL — HIGH (ref 70–99)
HBA1C BLD-MCNC: 7.1 % — HIGH (ref 4–5.6)
HCT VFR BLD CALC: 33.8 % — LOW (ref 34.5–45)
HDLC SERPL-MCNC: 62 MG/DL — SIGNIFICANT CHANGE UP (ref 45–65)
HGB BLD-MCNC: 10 G/DL — LOW (ref 11.5–15.5)
LIPID PNL WITH DIRECT LDL SERPL: 148 MG/DL — SIGNIFICANT CHANGE UP
MAGNESIUM SERPL-MCNC: 2 MG/DL — SIGNIFICANT CHANGE UP (ref 1.6–2.6)
MCHC RBC-ENTMCNC: 25.1 PG — LOW (ref 27–34)
MCHC RBC-ENTMCNC: 29.6 % — LOW (ref 32–36)
MCV RBC AUTO: 84.7 FL — SIGNIFICANT CHANGE UP (ref 80–100)
NRBC # FLD: 0 K/UL — SIGNIFICANT CHANGE UP (ref 0–0)
PHOSPHATE SERPL-MCNC: 3.6 MG/DL — SIGNIFICANT CHANGE UP (ref 2.5–4.5)
PLATELET # BLD AUTO: 273 K/UL — SIGNIFICANT CHANGE UP (ref 150–400)
PMV BLD: 10.6 FL — SIGNIFICANT CHANGE UP (ref 7–13)
POTASSIUM SERPL-MCNC: 4.1 MMOL/L — SIGNIFICANT CHANGE UP (ref 3.5–5.3)
POTASSIUM SERPL-SCNC: 4.1 MMOL/L — SIGNIFICANT CHANGE UP (ref 3.5–5.3)
RBC # BLD: 3.99 M/UL — SIGNIFICANT CHANGE UP (ref 3.8–5.2)
RBC # FLD: 13.5 % — SIGNIFICANT CHANGE UP (ref 10.3–14.5)
SODIUM SERPL-SCNC: 140 MMOL/L — SIGNIFICANT CHANGE UP (ref 135–145)
TRIGL SERPL-MCNC: 80 MG/DL — SIGNIFICANT CHANGE UP (ref 10–149)
TROPONIN T, HIGH SENSITIVITY: 64 NG/L — CRITICAL HIGH (ref ?–14)
TSH SERPL-MCNC: 2.76 UIU/ML — SIGNIFICANT CHANGE UP (ref 0.27–4.2)
WBC # BLD: 10.14 K/UL — SIGNIFICANT CHANGE UP (ref 3.8–10.5)
WBC # FLD AUTO: 10.14 K/UL — SIGNIFICANT CHANGE UP (ref 3.8–10.5)

## 2019-07-06 PROCEDURE — 93970 EXTREMITY STUDY: CPT | Mod: 26

## 2019-07-06 RX ORDER — MEMANTINE HYDROCHLORIDE 10 MG/1
1 TABLET ORAL
Qty: 0 | Refills: 0 | DISCHARGE

## 2019-07-06 RX ORDER — ENOXAPARIN SODIUM 100 MG/ML
60 INJECTION SUBCUTANEOUS ONCE
Refills: 0 | Status: COMPLETED | OUTPATIENT
Start: 2019-07-06 | End: 2019-07-06

## 2019-07-06 RX ORDER — FUROSEMIDE 40 MG
40 TABLET ORAL
Refills: 0 | Status: DISCONTINUED | OUTPATIENT
Start: 2019-07-06 | End: 2019-07-08

## 2019-07-06 RX ORDER — MEMANTINE HYDROCHLORIDE 10 MG/1
10 TABLET ORAL DAILY
Refills: 0 | Status: DISCONTINUED | OUTPATIENT
Start: 2019-07-06 | End: 2019-07-08

## 2019-07-06 RX ORDER — DIGOXIN 250 MCG
0.12 TABLET ORAL DAILY
Refills: 0 | Status: DISCONTINUED | OUTPATIENT
Start: 2019-07-06 | End: 2019-07-08

## 2019-07-06 RX ORDER — ASPIRIN/CALCIUM CARB/MAGNESIUM 324 MG
162 TABLET ORAL ONCE
Refills: 0 | Status: COMPLETED | OUTPATIENT
Start: 2019-07-06 | End: 2019-07-06

## 2019-07-06 RX ORDER — FERROUS SULFATE 325(65) MG
325 TABLET ORAL DAILY
Refills: 0 | Status: DISCONTINUED | OUTPATIENT
Start: 2019-07-06 | End: 2019-07-08

## 2019-07-06 RX ORDER — DONEPEZIL HYDROCHLORIDE 10 MG/1
1 TABLET, FILM COATED ORAL
Qty: 0 | Refills: 0 | DISCHARGE

## 2019-07-06 RX ORDER — ASPIRIN/CALCIUM CARB/MAGNESIUM 324 MG
81 TABLET ORAL DAILY
Refills: 0 | Status: DISCONTINUED | OUTPATIENT
Start: 2019-07-06 | End: 2019-07-08

## 2019-07-06 RX ORDER — GLUCAGON INJECTION, SOLUTION 0.5 MG/.1ML
1 INJECTION, SOLUTION SUBCUTANEOUS ONCE
Refills: 0 | Status: DISCONTINUED | OUTPATIENT
Start: 2019-07-06 | End: 2019-07-08

## 2019-07-06 RX ORDER — FERROUS SULFATE 325(65) MG
1 TABLET ORAL
Qty: 0 | Refills: 0 | DISCHARGE

## 2019-07-06 RX ORDER — DIGOXIN 250 MCG
1 TABLET ORAL
Qty: 0 | Refills: 0 | DISCHARGE

## 2019-07-06 RX ORDER — INSULIN LISPRO 100/ML
VIAL (ML) SUBCUTANEOUS
Refills: 0 | Status: DISCONTINUED | OUTPATIENT
Start: 2019-07-06 | End: 2019-07-08

## 2019-07-06 RX ORDER — DEXTROSE 50 % IN WATER 50 %
15 SYRINGE (ML) INTRAVENOUS ONCE
Refills: 0 | Status: DISCONTINUED | OUTPATIENT
Start: 2019-07-06 | End: 2019-07-08

## 2019-07-06 RX ORDER — DEXTROSE 50 % IN WATER 50 %
12.5 SYRINGE (ML) INTRAVENOUS ONCE
Refills: 0 | Status: DISCONTINUED | OUTPATIENT
Start: 2019-07-06 | End: 2019-07-08

## 2019-07-06 RX ORDER — INSULIN LISPRO 100/ML
VIAL (ML) SUBCUTANEOUS AT BEDTIME
Refills: 0 | Status: DISCONTINUED | OUTPATIENT
Start: 2019-07-06 | End: 2019-07-08

## 2019-07-06 RX ORDER — DEXTROSE 50 % IN WATER 50 %
25 SYRINGE (ML) INTRAVENOUS ONCE
Refills: 0 | Status: DISCONTINUED | OUTPATIENT
Start: 2019-07-06 | End: 2019-07-08

## 2019-07-06 RX ORDER — DONEPEZIL HYDROCHLORIDE 10 MG/1
10 TABLET, FILM COATED ORAL AT BEDTIME
Refills: 0 | Status: DISCONTINUED | OUTPATIENT
Start: 2019-07-06 | End: 2019-07-08

## 2019-07-06 RX ORDER — DABIGATRAN ETEXILATE MESYLATE 150 MG/1
75 CAPSULE ORAL
Refills: 0 | Status: DISCONTINUED | OUTPATIENT
Start: 2019-07-06 | End: 2019-07-08

## 2019-07-06 RX ORDER — SODIUM CHLORIDE 9 MG/ML
1000 INJECTION, SOLUTION INTRAVENOUS
Refills: 0 | Status: DISCONTINUED | OUTPATIENT
Start: 2019-07-06 | End: 2019-07-08

## 2019-07-06 RX ORDER — METOPROLOL TARTRATE 50 MG
25 TABLET ORAL
Refills: 0 | Status: DISCONTINUED | OUTPATIENT
Start: 2019-07-06 | End: 2019-07-08

## 2019-07-06 RX ORDER — DABIGATRAN ETEXILATE MESYLATE 150 MG/1
1 CAPSULE ORAL
Qty: 0 | Refills: 0 | DISCHARGE

## 2019-07-06 RX ADMIN — MEMANTINE HYDROCHLORIDE 10 MILLIGRAM(S): 10 TABLET ORAL at 09:08

## 2019-07-06 RX ADMIN — Medication 40 MILLIGRAM(S): at 06:56

## 2019-07-06 RX ADMIN — Medication 1 DROP(S): at 18:07

## 2019-07-06 RX ADMIN — Medication 7.5 MILLIGRAM(S): at 07:26

## 2019-07-06 RX ADMIN — DONEPEZIL HYDROCHLORIDE 10 MILLIGRAM(S): 10 TABLET, FILM COATED ORAL at 21:06

## 2019-07-06 RX ADMIN — Medication 40 MILLIGRAM(S): at 18:07

## 2019-07-06 RX ADMIN — Medication 7.5 MILLIGRAM(S): at 18:08

## 2019-07-06 RX ADMIN — ENOXAPARIN SODIUM 60 MILLIGRAM(S): 100 INJECTION SUBCUTANEOUS at 00:53

## 2019-07-06 RX ADMIN — Medication 0.12 MILLIGRAM(S): at 06:56

## 2019-07-06 RX ADMIN — Medication 81 MILLIGRAM(S): at 09:09

## 2019-07-06 RX ADMIN — Medication 1: at 09:09

## 2019-07-06 RX ADMIN — Medication 25 MILLIGRAM(S): at 18:07

## 2019-07-06 RX ADMIN — Medication 25 MILLIGRAM(S): at 06:56

## 2019-07-06 RX ADMIN — Medication 162 MILLIGRAM(S): at 00:54

## 2019-07-06 RX ADMIN — Medication 325 MILLIGRAM(S): at 09:09

## 2019-07-06 RX ADMIN — DABIGATRAN ETEXILATE MESYLATE 75 MILLIGRAM(S): 150 CAPSULE ORAL at 12:52

## 2019-07-06 RX ADMIN — DABIGATRAN ETEXILATE MESYLATE 75 MILLIGRAM(S): 150 CAPSULE ORAL at 21:06

## 2019-07-06 NOTE — H&P ADULT - ASSESSMENT
82 y/o F with PMH of DVT, Afib(on Pradaxa), DM type II, HTN, HLD, CHF(with EF of 60%), PPM presented with the complaint of worsening bilateral LE swelling for the past few days. R/o CHF exacerbation     +Acute on chronic diastolic CHF-On IV Lasix 40mg BID  +NSTEMI-s/p Lovenox in the ED

## 2019-07-06 NOTE — H&P ADULT - GASTROINTESTINAL DETAILS
bowel sounds normal/no distention/soft/no rebound tenderness/no rigidity/normal/nontender/no guarding

## 2019-07-06 NOTE — H&P ADULT - NEGATIVE GASTROINTESTINAL SYMPTOMS
no diarrhea/no abdominal pain/no melena/no constipation/no change in bowel habits/no hematochezia/no nausea/no vomiting

## 2019-07-06 NOTE — PHYSICAL THERAPY INITIAL EVALUATION ADULT - HEALTH SCREEN CRITERIA
05/19/19 0335   Vent Information   Vent Type 840   Vent Mode AC/VC   Vt Ordered 410 mL   Rate Set 18 bmp   Peak Flow 80 L/min   FiO2  50 %   Sensitivity 3   PEEP/CPAP 5   Humidification Source Heated wire   Humidification Temp 37   Humidification Temp Measured 37.4   Circuit Condensation Drained   Cough/Sputum   Sputum Amount None   Spontaneous Breathing Trial (SBT) RT Doc   SpO2 94 %   Breath Sounds   Right Upper Lobe Rhonchi   Right Middle Lobe Rhonchi   Right Lower Lobe Rhonchi   Left Upper Lobe Rhonchi   Left Lower Lobe Rhonchi   Additional Respiratory  Assessments   Resp 20   Position Semi-Triplett's   Alarm Settings   High Pressure Alarm 45 cmH2O   Low Minute Volume Alarm 4 L/min   High Respiratory Rate 50 br/min yes

## 2019-07-06 NOTE — H&P ADULT - PROBLEM SELECTOR PLAN 3
[id number] Score of 8 and patient on Pradaxa at home for anticoagulation. Patient s/p Lovenox 1mg/kg in the ED for NSTEMI  Continue with Metoprolol and Digoxin for rate control   Digoxin level with morning labs [id number] Score of 8 and patient on Pradaxa at home for anticoagulation. Patient s/p Lovenox 1mg/kg in the ED for NSTEMI. Will continue with Pradaxa for now as per attending' s request.   Continue with Metoprolol and Digoxin for rate control   Digoxin level with morning labs

## 2019-07-06 NOTE — H&P ADULT - NEGATIVE OPHTHALMOLOGIC SYMPTOMS
no lacrimation L/no lacrimation R/no blurred vision L/no discharge L/no photophobia/no blurred vision R/no discharge R/no diplopia

## 2019-07-06 NOTE — H&P ADULT - PROBLEM SELECTOR PLAN 1
HsT on admission was 53 with repeat 58. EKG with no significant dynamic ischemic changes. Patient denied any chest pain. Likely troponin leak in the setting of underline CHF   Will monitor on telemetry, serial EKG and Mauro prn for any episodes of chest pain   HgbA1C, TSH, lipid profile, CBC, CMP in am   TTE ordered   Will trend 1 more set of cardiac enzyme to check troponin level   Continue with Aspirin 81mg daily  Patient was given Lovenox 1mg/kg in the ED for anticoagulation HsT on admission was 53 with repeat 58. EKG with no significant dynamic ischemic changes. Patient denied any chest pain. Likely troponin leak in the setting of underline CHF   Will monitor on telemetry, serial EKG and Mauro prn for any episodes of chest pain   HgbA1C, TSH, lipid profile, CBC, CMP in am   TTE ordered   Will trend 1 more set of cardiac enzyme to check troponin level   Continue with Aspirin 81mg daily  Patient was given Lovenox 1mg/kg in the ED for anticoagulation. Will continue with Pradaxa for now as per attending' s request

## 2019-07-06 NOTE — H&P ADULT - NEGATIVE MUSCULOSKELETAL SYMPTOMS
no arthralgia/no joint swelling/no myalgia/no arthritis/no stiffness/no muscle cramps/no neck pain/no muscle weakness

## 2019-07-06 NOTE — H&P ADULT - PROBLEM SELECTOR PLAN 2
Admit to telemetry  Low sodium diet, daily weights, monitor I's and O's, fluid restrictions 1000cc/day  Check BNP in 48 hours   Started on Lasix IV 40mg BID    TTE ordered to evaluate LVEF

## 2019-07-06 NOTE — H&P ADULT - RS GEN PE MLT RESP DETAILS PC
normal/respirations non-labored/airway patent/no chest wall tenderness/no intercostal retractions/no rhonchi/no wheezes/rales

## 2019-07-06 NOTE — H&P ADULT - HISTORY OF PRESENT ILLNESS
84 y/o F with PMH of DVT, Afib(on Pradaxa), DM type II, HTN, HLD, CHF(with EF of 60%), PPM presented with the complaint of worsening bilateral LE swelling for the past few days. 82 y/o F with PMH of DVT, Afib(on Pradaxa), DM type II, HTN, HLD, CHF(with EF of 60%), PPM presented with the complaint of worsening bilateral LE swelling for the past few days. As per the patient she has chronic Hx of bilateral LE swelling. Patient stated that for the past few days she has noticed that her LE has been more swollen. Patient stated that she keeps her legs elevated when she lying in bed. Patient stated that she thinks she might have not taken her water pills in few days as she ran out of it. Patient stated that she does not eat food high in salt but does drink a lot of water. Patient stated that with the swelling she has also noticed bilateral erythema in both legs(which she has had also in the past). Patient stated that she has no SUAREZ and denied any orthopnea or PND. Patient denied any CP, SOB, fevers, chills, N/V/D/C, abdominal pain, dysuria, melena, hematochezia, recent travel, sick contact, pleuritic or positional chest pain.     On ED admission EKG revealed Atrial fibrillation with V-paced rhythm at a rate of 76 with LVH and QTc of 519, CE x 1: Trop: 53, CE x 2: Trop: 58, H&H: 9.5/32.7, K: 5.6->moderately hemolyzed, Gluc: 127, Alb: 3.2, AST: 33, BNP: 4332. CXR: Hazy indistinct right CP angle could be due to overlying soft tissues versus a small right pleural effusion. Sharp left CP angle. Clear remaining visualized lungs. Stable left chest wall dual-lead pacemaker, cardiomegaly, aortic calcifications, and slightly tortuous descending thoracic aorta contour. Trachea midline. Generalized osteopenia and slight spinal curvature again noted. In the ED patient received Lovenox and IV Lasix 40mg. When examined patient is resting in the stretcher and denied any current complaints.

## 2019-07-06 NOTE — H&P ADULT - NEGATIVE NEUROLOGICAL SYMPTOMS
no vertigo/no difficulty walking/no syncope/no tremors/no headache/no hemiparesis/no focal seizures/no transient paralysis/no weakness/no paresthesias/no generalized seizures/no loss of sensation/no loss of consciousness/no confusion

## 2019-07-06 NOTE — H&P ADULT - PROBLEM SELECTOR PLAN 8
Already therapeutic as patient was given Lovenox 1mg/kg in the ED for NSTEMI. Already therapeutic as patient was given Lovenox 1mg/kg in the ED for NSTEMI. Started back on Pradaxa as per attendings request

## 2019-07-06 NOTE — H&P ADULT - NSHPSOCIALHISTORY_GEN_ALL_CORE
Lives alone, retired   Never smoked/but used to drink when she was in her 20s/denied any illicit drug use

## 2019-07-06 NOTE — H&P ADULT - NSICDXPASTMEDICALHX_GEN_ALL_CORE_FT
PAST MEDICAL HISTORY:  Atrial Fibrillation On Pradaxa    DM (diabetes mellitus) Type II    DVT (deep venous thrombosis)     Hyperlipemia     Hypertension

## 2019-07-06 NOTE — H&P ADULT - NEGATIVE ENMT SYMPTOMS
no nasal congestion/no hearing difficulty/no ear pain/no tinnitus/no vertigo/no sinus symptoms/no nasal discharge

## 2019-07-06 NOTE — H&P ADULT - NSHPLABSRESULTS_GEN_ALL_CORE
9.5    7.84  )-----------( 230      ( 05 Jul 2019 22:15 )             32.7     07-05    138  |  102  |  19  ----------------------------<  127<H>  5.6<H>   |  24  |  0.95    Ca    8.8      05 Jul 2019 22:15  Phos  3.1     07-05  Mg     2.0     07-05    TPro  6.0  /  Alb  3.2<L>  /  TBili  0.2  /  DBili  x   /  AST  33<H>  /  ALT  11  /  AlkPhos  77  07-05    CXR: Hazy indistinct right CP angle could be due to overlying soft tissues versus a small right pleural effusion. Sharp left CP angle. Clear remaining visualized lungs. Stable left chest wall dual-lead pacemaker, cardiomegaly, aortic calcifications, and slightly tortuous descending thoracic aorta contour. Trachea midline. Generalized osteopenia and slight spinal curvature again noted.     EKG: Atrial fibrillation with V-paced rhythm at a rate of 76 with LVH and QTc of 519

## 2019-07-06 NOTE — PHYSICAL THERAPY INITIAL EVALUATION ADULT - PERTINENT HX OF CURRENT PROBLEM, REHAB EVAL
Patient is 83 year old female admitted with history of HTN, DM2, AFIB, DVT, CHF( EF 60%), PPM, presents with worsening both LE swelling.

## 2019-07-07 LAB
ANION GAP SERPL CALC-SCNC: 16 MMO/L — HIGH (ref 7–14)
BUN SERPL-MCNC: 23 MG/DL — SIGNIFICANT CHANGE UP (ref 7–23)
CALCIUM SERPL-MCNC: 9.4 MG/DL — SIGNIFICANT CHANGE UP (ref 8.4–10.5)
CHLORIDE SERPL-SCNC: 98 MMOL/L — SIGNIFICANT CHANGE UP (ref 98–107)
CO2 SERPL-SCNC: 24 MMOL/L — SIGNIFICANT CHANGE UP (ref 22–31)
CREAT SERPL-MCNC: 0.85 MG/DL — SIGNIFICANT CHANGE UP (ref 0.5–1.3)
GLUCOSE BLDC GLUCOMTR-MCNC: 139 MG/DL — HIGH (ref 70–99)
GLUCOSE BLDC GLUCOMTR-MCNC: 202 MG/DL — HIGH (ref 70–99)
GLUCOSE BLDC GLUCOMTR-MCNC: 248 MG/DL — HIGH (ref 70–99)
GLUCOSE SERPL-MCNC: 245 MG/DL — HIGH (ref 70–99)
HCT VFR BLD CALC: 33 % — LOW (ref 34.5–45)
HGB BLD-MCNC: 9.8 G/DL — LOW (ref 11.5–15.5)
MAGNESIUM SERPL-MCNC: 2 MG/DL — SIGNIFICANT CHANGE UP (ref 1.6–2.6)
MCHC RBC-ENTMCNC: 25.1 PG — LOW (ref 27–34)
MCHC RBC-ENTMCNC: 29.7 % — LOW (ref 32–36)
MCV RBC AUTO: 84.4 FL — SIGNIFICANT CHANGE UP (ref 80–100)
NRBC # FLD: 0 K/UL — SIGNIFICANT CHANGE UP (ref 0–0)
PHOSPHATE SERPL-MCNC: 4 MG/DL — SIGNIFICANT CHANGE UP (ref 2.5–4.5)
PLATELET # BLD AUTO: 262 K/UL — SIGNIFICANT CHANGE UP (ref 150–400)
PMV BLD: 10.4 FL — SIGNIFICANT CHANGE UP (ref 7–13)
POTASSIUM SERPL-MCNC: 3.9 MMOL/L — SIGNIFICANT CHANGE UP (ref 3.5–5.3)
POTASSIUM SERPL-SCNC: 3.9 MMOL/L — SIGNIFICANT CHANGE UP (ref 3.5–5.3)
RBC # BLD: 3.91 M/UL — SIGNIFICANT CHANGE UP (ref 3.8–5.2)
RBC # FLD: 13.7 % — SIGNIFICANT CHANGE UP (ref 10.3–14.5)
SODIUM SERPL-SCNC: 138 MMOL/L — SIGNIFICANT CHANGE UP (ref 135–145)
WBC # BLD: 7.5 K/UL — SIGNIFICANT CHANGE UP (ref 3.8–10.5)
WBC # FLD AUTO: 7.5 K/UL — SIGNIFICANT CHANGE UP (ref 3.8–10.5)

## 2019-07-07 PROCEDURE — 93306 TTE W/DOPPLER COMPLETE: CPT | Mod: 26

## 2019-07-07 RX ADMIN — DABIGATRAN ETEXILATE MESYLATE 75 MILLIGRAM(S): 150 CAPSULE ORAL at 23:40

## 2019-07-07 RX ADMIN — DABIGATRAN ETEXILATE MESYLATE 75 MILLIGRAM(S): 150 CAPSULE ORAL at 15:05

## 2019-07-07 RX ADMIN — Medication 40 MILLIGRAM(S): at 18:43

## 2019-07-07 RX ADMIN — Medication 1 DROP(S): at 18:43

## 2019-07-07 RX ADMIN — Medication 7.5 MILLIGRAM(S): at 18:43

## 2019-07-07 RX ADMIN — Medication 40 MILLIGRAM(S): at 05:38

## 2019-07-07 RX ADMIN — Medication 25 MILLIGRAM(S): at 05:39

## 2019-07-07 RX ADMIN — Medication 2: at 18:47

## 2019-07-07 RX ADMIN — Medication 325 MILLIGRAM(S): at 09:21

## 2019-07-07 RX ADMIN — Medication 7.5 MILLIGRAM(S): at 05:39

## 2019-07-07 RX ADMIN — Medication 81 MILLIGRAM(S): at 09:21

## 2019-07-07 RX ADMIN — Medication 25 MILLIGRAM(S): at 18:43

## 2019-07-07 RX ADMIN — Medication 2: at 09:21

## 2019-07-07 RX ADMIN — Medication 1 DROP(S): at 05:39

## 2019-07-07 RX ADMIN — Medication 0.12 MILLIGRAM(S): at 05:39

## 2019-07-07 RX ADMIN — DONEPEZIL HYDROCHLORIDE 10 MILLIGRAM(S): 10 TABLET, FILM COATED ORAL at 22:39

## 2019-07-07 RX ADMIN — MEMANTINE HYDROCHLORIDE 10 MILLIGRAM(S): 10 TABLET ORAL at 09:22

## 2019-07-07 NOTE — PROGRESS NOTE ADULT - SUBJECTIVE AND OBJECTIVE BOX
Patient is a 83y old  Female who presents with a chief complaint of Worsening LE edema (06 Jul 2019 05:33)      INTERVAL HPI/OVERNIGHT EVENTS: none    MEDICATIONS  (STANDING):  artificial  tears Solution 1 Drop(s) Both EYES two times a day  aspirin enteric coated 81 milliGRAM(s) Oral daily  busPIRone 7.5 milliGRAM(s) Oral two times a day  dabigatran 75 milliGRAM(s) Oral two times a day  dextrose 5%. 1000 milliLiter(s) (50 mL/Hr) IV Continuous <Continuous>  dextrose 50% Injectable 12.5 Gram(s) IV Push once  dextrose 50% Injectable 25 Gram(s) IV Push once  dextrose 50% Injectable 25 Gram(s) IV Push once  digoxin     Tablet 0.125 milliGRAM(s) Oral daily  donepezil 10 milliGRAM(s) Oral at bedtime  ferrous    sulfate 325 milliGRAM(s) Oral daily  furosemide   Injectable 40 milliGRAM(s) IV Push two times a day  insulin lispro (HumaLOG) corrective regimen sliding scale   SubCutaneous three times a day before meals  insulin lispro (HumaLOG) corrective regimen sliding scale   SubCutaneous at bedtime  memantine 10 milliGRAM(s) Oral daily  metoprolol tartrate 25 milliGRAM(s) Oral two times a day    MEDICATIONS  (PRN):  dextrose 40% Gel 15 Gram(s) Oral once PRN Blood Glucose LESS THAN 70 milliGRAM(s)/deciliter  glucagon  Injectable 1 milliGRAM(s) IntraMuscular once PRN Glucose LESS THAN 70 milligrams/deciliter            Allergies    No Known Allergies    Intolerances        REVIEW OF SYSTEMS:  CARDIOVASCULAR: No chest pain, palpitations, dizziness, or leg swelling; no shortness of breath     RESPIRATORY: No cough, wheezing, chills or hemoptysis; No shortness of breath    GASTROINTESTINAL: No abdominal or epigastric pain. No nausea, vomiting, or hematemesis; No diarrhea or constipation. No melena or hematochezia.    NEUROLOGICAL: No headaches, memory loss, loss of strength, numbness      PHYSICAL EXAM:  Vital Signs Last 24 Hrs  T(C): 36.2 (07 Jul 2019 04:40), Max: 36.7 (06 Jul 2019 12:28)  T(F): 97.2 (07 Jul 2019 04:40), Max: 98 (06 Jul 2019 12:28)  HR: 81 (07 Jul 2019 04:40) (74 - 83)  BP: 134/71 (07 Jul 2019 04:40) (122/61 - 142/97)  BP(mean): --  RR: 18 (07 Jul 2019 04:40) (18 - 18)  SpO2: 99% (07 Jul 2019 04:40) (96% - 99%)    GENERAL: NAD, well-groomed, well-developed  HEAD:  Atraumatic, Normocephalic  EYES: EOMI, PERRLA, conjunctiva and sclera clear  NECK: Supple, No JVD, Normal thyroid  NERVOUS SYSTEM:  Alert & Oriented X3, Good concentration;  and symmetric  CHEST/LUNG: Clear to auscultation bilaterally; No rales, rhonchi, wheezing, or rubs  HEART: S1S2 regular, with II/VI GERRI left base, without  rub nor gallop  ABDOMEN: Soft, Nontender, Nondistended; Bowel sounds present  EXTREMITIES:  mild edema     LABS:                        9.8    7.50  )-----------( 262      ( 07 Jul 2019 05:59 )             33.0     06 Jul 2019 07:30    140    |  100    |  18     ----------------------------<  134    4.1     |  23     |  0.93     Ca    9.0        06 Jul 2019 07:30  Phos  3.6       06 Jul 2019 07:30  Mg     2.0       06 Jul 2019 07:30    wt 61 KG    CAPILLARY BLOOD GLUCOSE      POCT Blood Glucose.: 188 mg/dL (06 Jul 2019 22:05)  POCT Blood Glucose.: 144 mg/dL (06 Jul 2019 17:12)  POCT Blood Glucose.: 132 mg/dL (06 Jul 2019 12:44)  POCT Blood Glucose.: 161 mg/dL (06 Jul 2019 08:56)           assessment:  chf.     Plan:   check today's weight.  Continue to diurese.  Echo  Consider ARB.  Follow sugars.  D/C planning   D/W daughter, [phone]

## 2019-07-08 ENCOUNTER — TRANSCRIPTION ENCOUNTER (OUTPATIENT)
Age: 84
End: 2019-07-08

## 2019-07-08 VITALS
TEMPERATURE: 98 F | OXYGEN SATURATION: 98 % | SYSTOLIC BLOOD PRESSURE: 138 MMHG | RESPIRATION RATE: 19 BRPM | DIASTOLIC BLOOD PRESSURE: 93 MMHG | HEART RATE: 86 BPM

## 2019-07-08 LAB
ANION GAP SERPL CALC-SCNC: 12 MMO/L — SIGNIFICANT CHANGE UP (ref 7–14)
BUN SERPL-MCNC: 24 MG/DL — HIGH (ref 7–23)
CALCIUM SERPL-MCNC: 9.2 MG/DL — SIGNIFICANT CHANGE UP (ref 8.4–10.5)
CHLORIDE SERPL-SCNC: 101 MMOL/L — SIGNIFICANT CHANGE UP (ref 98–107)
CO2 SERPL-SCNC: 30 MMOL/L — SIGNIFICANT CHANGE UP (ref 22–31)
CREAT SERPL-MCNC: 0.9 MG/DL — SIGNIFICANT CHANGE UP (ref 0.5–1.3)
GLUCOSE BLDC GLUCOMTR-MCNC: 156 MG/DL — HIGH (ref 70–99)
GLUCOSE BLDC GLUCOMTR-MCNC: 162 MG/DL — HIGH (ref 70–99)
GLUCOSE BLDC GLUCOMTR-MCNC: 183 MG/DL — HIGH (ref 70–99)
GLUCOSE SERPL-MCNC: 149 MG/DL — HIGH (ref 70–99)
HCT VFR BLD CALC: 31.5 % — LOW (ref 34.5–45)
HGB BLD-MCNC: 9.4 G/DL — LOW (ref 11.5–15.5)
MAGNESIUM SERPL-MCNC: 2 MG/DL — SIGNIFICANT CHANGE UP (ref 1.6–2.6)
MCHC RBC-ENTMCNC: 25.3 PG — LOW (ref 27–34)
MCHC RBC-ENTMCNC: 29.8 % — LOW (ref 32–36)
MCV RBC AUTO: 84.9 FL — SIGNIFICANT CHANGE UP (ref 80–100)
NRBC # FLD: 0 K/UL — SIGNIFICANT CHANGE UP (ref 0–0)
NT-PROBNP SERPL-SCNC: 5030 PG/ML — SIGNIFICANT CHANGE UP
PHOSPHATE SERPL-MCNC: 3.5 MG/DL — SIGNIFICANT CHANGE UP (ref 2.5–4.5)
PLATELET # BLD AUTO: 248 K/UL — SIGNIFICANT CHANGE UP (ref 150–400)
PMV BLD: 10.8 FL — SIGNIFICANT CHANGE UP (ref 7–13)
POTASSIUM SERPL-MCNC: 3.8 MMOL/L — SIGNIFICANT CHANGE UP (ref 3.5–5.3)
POTASSIUM SERPL-SCNC: 3.8 MMOL/L — SIGNIFICANT CHANGE UP (ref 3.5–5.3)
RBC # BLD: 3.71 M/UL — LOW (ref 3.8–5.2)
RBC # FLD: 13.8 % — SIGNIFICANT CHANGE UP (ref 10.3–14.5)
SODIUM SERPL-SCNC: 143 MMOL/L — SIGNIFICANT CHANGE UP (ref 135–145)
WBC # BLD: 6.79 K/UL — SIGNIFICANT CHANGE UP (ref 3.8–10.5)
WBC # FLD AUTO: 6.79 K/UL — SIGNIFICANT CHANGE UP (ref 3.8–10.5)

## 2019-07-08 PROCEDURE — 93280 PM DEVICE PROGR EVAL DUAL: CPT | Mod: 26

## 2019-07-08 RX ORDER — FUROSEMIDE 40 MG
1 TABLET ORAL
Qty: 0 | Refills: 0 | DISCHARGE

## 2019-07-08 RX ADMIN — Medication 1 DROP(S): at 05:59

## 2019-07-08 RX ADMIN — Medication 7.5 MILLIGRAM(S): at 05:58

## 2019-07-08 RX ADMIN — Medication 81 MILLIGRAM(S): at 09:02

## 2019-07-08 RX ADMIN — Medication 1: at 13:50

## 2019-07-08 RX ADMIN — Medication 7.5 MILLIGRAM(S): at 17:06

## 2019-07-08 RX ADMIN — Medication 40 MILLIGRAM(S): at 17:06

## 2019-07-08 RX ADMIN — Medication 325 MILLIGRAM(S): at 09:02

## 2019-07-08 RX ADMIN — Medication 40 MILLIGRAM(S): at 05:59

## 2019-07-08 RX ADMIN — MEMANTINE HYDROCHLORIDE 10 MILLIGRAM(S): 10 TABLET ORAL at 09:02

## 2019-07-08 RX ADMIN — Medication 0.12 MILLIGRAM(S): at 05:59

## 2019-07-08 RX ADMIN — Medication 1: at 09:02

## 2019-07-08 RX ADMIN — DABIGATRAN ETEXILATE MESYLATE 75 MILLIGRAM(S): 150 CAPSULE ORAL at 13:51

## 2019-07-08 RX ADMIN — Medication 25 MILLIGRAM(S): at 17:06

## 2019-07-08 RX ADMIN — Medication 25 MILLIGRAM(S): at 05:59

## 2019-07-08 RX ADMIN — Medication 1 DROP(S): at 17:06

## 2019-07-08 NOTE — DIETITIAN INITIAL EVALUATION ADULT. - OTHER INFO
Pt 82 yo female with Pt 84 yo female presented with complaint of Worsening LE edema. At time of visit Pt appears alert, oriented. Per Pt her appetite usually fine; No chew/swallow problem voiced; no nausea/vomiting/diarrhea reported @ present. Of note Pt's HbA1c level 7.1% (7/6). At home Pt follows therapeutic diet: Low Salt, Consistent Carbohydrate diet reported. Pt's diet rx includes Consistent Carbohydrate, DASH/TLC (cholesterol and sodium restricted) with fluid restriction. RDN offered written materials on therapeutic diet, but Pt declined. No other food related concerns voiced @ present. Pt not sure about her height; no weight loss or weight changes voiced. Case discussed with nurse. RDN remains available, Pt made aware.

## 2019-07-08 NOTE — DISCHARGE NOTE PROVIDER - CARE PROVIDERS DIRECT ADDRESSES
,DirectAddress_Unknown ,DirectAddress_Unknown,[email or web address]

## 2019-07-08 NOTE — DIETITIAN INITIAL EVALUATION ADULT. - PERTINENT LABORATORY DATA
(7/7) H/H 9.8/33.0 L, Glu 245 H;          (7/6) Cholesterol 208 H, HbA1c 7.1% H;            (7/5) Albumin 3.2 L, AST 33 H

## 2019-07-08 NOTE — DISCHARGE NOTE NURSING/CASE MANAGEMENT/SOCIAL WORK - NSDCDPATPORTLINK_GEN_ALL_CORE
You can access the [facility] Patient Portal, offered by [facility], by registering with the following website: [email or web address]

## 2019-07-08 NOTE — DISCHARGE NOTE PROVIDER - HOSPITAL COURSE
84 y/o F with PMH of DVT, Afib(on Pradaxa), DM type II, HTN, HLD, CHF(with EF of 60%), PPM presented with the complaint of worsening bilateral LE swelling for the past few days.         1. NSTEMI    -Patient s/p Lovenox in the ED and now back on     -TTE         2. Acute on chronic diastolic CHF    -Patient on lasix 40 IV BID     LE dopplers negative for DVT    7/7 echo-  Mitral annular calcification and calcified mitral leaflets with normal diastolic opening. Mild-moderate mitral regurgitation. Mean transmitral valve gradient    equals 4 mm Hg, consistent with mild mitral stenosis. Calcified trileaflet aortic valve with normal opening. Severely dilated left atrium.  LA volume index = 70    cc/m2.  Mild to moderate global left ventricular systolic dysfunction.  Right atrial enlargement. Normal right ventricular size and function.  Normal tricuspid valve. Severe tricuspid regurgitation. Estimated pulmonary artery systolic pressure equals 85    mm Hg, assuming right atrial pressure equals 10  mm Hg,consistent with severe pulmonary hypertension. 84 y/o F with PMH of DVT, Afib(on Pradaxa), DM type II, HTN, HLD, CHF(with EF of 60%), PPM presented with the complaint of worsening bilateral LE swelling for the past few days.         1. NSTEMI    -Patient s/p Lovenox in the ED and now back on Dabigatran.        2. Acute on chronic diastolic CHF    -Patient was on lasix 40 IV BID and will go home Lasix PO 40mg twice a day.    LE dopplers negative for DVT    7/7 echo-  Mitral annular calcification and calcified mitral leaflets with normal diastolic opening. Mild-moderate mitral regurgitation. Mean transmitral valve gradient    equals 4 mm Hg, consistent with mild mitral stenosis. Calcified trileaflet aortic valve with normal opening. Severely dilated left atrium.  LA volume index = 70    cc/m2.  Mild to moderate global left ventricular systolic dysfunction.  Right atrial enlargement. Normal right ventricular size and function.  Normal tricuspid valve. Severe tricuspid regurgitation. Estimated pulmonary artery systolic pressure equals 85    mm Hg, assuming right atrial pressure equals 10  mm Hg,consistent with severe pulmonary hypertension.        3. PPM placed in 2014    - [employer] PPM Model #K174    - PPM was interrogated by EP         Case discussed with Dr. Hagan, pt medically stable for discharge. 84 y/o F with PMH of DVT, Afib(on Pradaxa), DM type II, HTN, HLD, CHF(with EF of 60%), PPM presented with the complaint of worsening bilateral LE swelling for the past few days.         1. NSTEMI    -Patient s/p Lovenox in the ED and now back on Dabigatran.        2. Acute on chronic diastolic CHF    -Patient was on lasix 40 IV BID and will go home Lasix PO 40mg twice a day.    LE dopplers negative for DVT    7/7 echo-  Mitral annular calcification and calcified mitral leaflets with normal diastolic opening. Mild-moderate mitral regurgitation. Mean transmitral valve gradient    equals 4 mm Hg, consistent with mild mitral stenosis. Calcified trileaflet aortic valve with normal opening. Severely dilated left atrium.  LA volume index = 70    cc/m2.  Mild to moderate global left ventricular systolic dysfunction.  Right atrial enlargement. Normal right ventricular size and function.  Normal tricuspid valve. Severe tricuspid regurgitation. Estimated pulmonary artery systolic pressure equals 85    mm Hg, assuming right atrial pressure equals 10  mm Hg,consistent with severe pulmonary hypertension.        3. PPM placed in 2014    - [employer] PPM Model #K174    - PPM was interrogated by EP         Case discussed with Dr. Hagan, pt medically stable for discharge.         **Incomplete 82 y/o F with PMH of DVT, Afib(on Pradaxa), DM type II, HTN, HLD, CHF(with EF of 60%), PPM presented with the complaint of worsening bilateral LE swelling for the past few days.         1. NSTEMI    -Patient s/p Lovenox in the ED and now back on Dabigatran.        2. Acute on chronic diastolic CHF    -Patient was on lasix 40 IV BID and will go home Lasix PO 40mg twice a day.    LE dopplers negative for DVT    7/7 echo-  Mitral annular calcification and calcified mitral leaflets with normal diastolic opening. Mild-moderate mitral regurgitation. Mean transmitral valve gradient    equals 4 mm Hg, consistent with mild mitral stenosis. Calcified trileaflet aortic valve with normal opening. Severely dilated left atrium.  LA volume index = 70    cc/m2.  Mild to moderate global left ventricular systolic dysfunction.  Right atrial enlargement. Normal right ventricular size and function.  Normal tricuspid valve. Severe tricuspid regurgitation. Estimated pulmonary artery systolic pressure equals 85    mm Hg, assuming right atrial pressure equals 10  mm Hg,consistent with severe pulmonary hypertension.        3. PPM placed in 2014    - [employer] PPM Model #K174    - PPM was interrogated by EP - has episodes of NSVT on tele but pt is already on BB and not due for an upgrade at this time. Pt will follow up with Dr. Wolfe on 7/22 as scheduled.         Case discussed with Dr. Hagan, pt medically stable for discharge.

## 2019-07-08 NOTE — DISCHARGE NOTE PROVIDER - CARE PROVIDER_API CALL
Carl Hagan)  Internal Medicine  [address] [place] [address]  Phone: [phone]  Fax: [phone]  Follow Up Time: Carl Hagan)  Internal Medicine  [address] [place] [address]  Phone: [phone]  Fax: [phone]  Follow Up Time:     Burt Wolfe)  Cardiac Electrophysiology; Cardiology; Internal Medicine  [address] [place] [address]  Phone: [phone]  Fax: [phone]  Follow Up Time:

## 2019-07-08 NOTE — DISCHARGE NOTE PROVIDER - PROVIDER TOKENS
PROVIDER:[TOKEN:[[id number]]] PROVIDER:[TOKEN:[[id number]]],PROVIDER:[TOKEN:[[id number]]]

## 2019-07-08 NOTE — DISCHARGE NOTE PROVIDER - NSDCCPCAREPLAN_GEN_ALL_CORE_FT
PRINCIPAL DISCHARGE DIAGNOSIS  Diagnosis: Acute on chronic diastolic (congestive) heart failure  Assessment and Plan of Treatment: You were admitted for heart failure. You were given IV Lasix 40mg twice a day and then transitioned to orally. You had an echo done that showed severely dilated left atrium, mild to moderate left ventricular systolic dysfunction, severe tricsupid regurgitation, and severe pulmonary hypertension. This is unchanged from 2014. Your Lasix dose of 40mg once a day that you were taking will be changed to Lasix 40mg twice a day. Please start to take Lasix 40mg twice  day. Please continue to take Metoprolol 25mg twice a day, Digoxin .125mg daily. Please make sure to limit your daily fluid intake to less than 2 liters per day. Please make sure to weigh yourself daily and if you notice a significant weight gain please report to the ER. Please follow up with your PCP in 1-2 weeks. PRINCIPAL DISCHARGE DIAGNOSIS  Diagnosis: Acute on chronic diastolic (congestive) heart failure  Assessment and Plan of Treatment: You were admitted for heart failure. You were given IV Lasix 40mg twice a day and then transitioned to orally. You had an echo done that showed severely dilated left atrium, mild to moderate left ventricular systolic dysfunction, severe tricsupid regurgitation, and severe pulmonary hypertension. This is unchanged from 2014. Your Lasix dose of 40mg once a day that you were taking will be changed to Lasix 40mg twice a day. Please start to take Lasix 40mg twice a day. Please continue to take Metoprolol 25mg twice a day, Digoxin .125mg daily. Please make sure to limit your daily fluid intake to less than 2 liters per day. Please make sure to weigh yourself daily and if you notice a significant weight gain please report to the ER. Please follow up with your PCP in 1-2 weeks.      SECONDARY DISCHARGE DIAGNOSES  Diagnosis: Paroxysmal atrial fibrillation  Assessment and Plan of Treatment: Electrophysiology came by to interrogate the pacemaker. Multiple episodes of ventricular tachycardia were found. Please make sure to take your Metoprolol 25mg twice a day and Digoxin .125mg daily. Please follow up with Dr. Wolfe on 7/22 as scheduled.

## 2019-07-08 NOTE — DIETITIAN INITIAL EVALUATION ADULT. - ADD RECOMMEND
1. Encourage & assist Pt with meals; Monitor PO diet tolerance; Honor food preferences;            2. Monitor labs, hydration status;

## 2019-07-08 NOTE — PROGRESS NOTE ADULT - SUBJECTIVE AND OBJECTIVE BOX
Patient is a 83y old  Female who presents with a chief complaint of Worsening LE edema (07 Jul 2019 06:59)      INTERVAL HPI/OVERNIGHT EVENTS: feels well     MEDICATIONS  (STANDING):  artificial  tears Solution 1 Drop(s) Both EYES two times a day  aspirin enteric coated 81 milliGRAM(s) Oral daily  busPIRone 7.5 milliGRAM(s) Oral two times a day  dabigatran 75 milliGRAM(s) Oral two times a day  dextrose 5%. 1000 milliLiter(s) (50 mL/Hr) IV Continuous <Continuous>  dextrose 50% Injectable 12.5 Gram(s) IV Push once  dextrose 50% Injectable 25 Gram(s) IV Push once  dextrose 50% Injectable 25 Gram(s) IV Push once  digoxin     Tablet 0.125 milliGRAM(s) Oral daily  donepezil 10 milliGRAM(s) Oral at bedtime  ferrous    sulfate 325 milliGRAM(s) Oral daily  furosemide   Injectable 40 milliGRAM(s) IV Push two times a day  insulin lispro (HumaLOG) corrective regimen sliding scale   SubCutaneous three times a day before meals  insulin lispro (HumaLOG) corrective regimen sliding scale   SubCutaneous at bedtime  memantine 10 milliGRAM(s) Oral daily  metoprolol tartrate 25 milliGRAM(s) Oral two times a day    MEDICATIONS  (PRN):  dextrose 40% Gel 15 Gram(s) Oral once PRN Blood Glucose LESS THAN 70 milliGRAM(s)/deciliter  glucagon  Injectable 1 milliGRAM(s) IntraMuscular once PRN Glucose LESS THAN 70 milligrams/deciliter        Orders last 24 hours:  POCT  Blood Glucose (07-07-19 @ 17:37)  POCT  Blood Glucose (07-07-19 @ 22:21)  Complete Blood Count: AM Sched. Collection: 09-Jul-2019 04:00 (07-08-19 @ 07:29)  Basic Metabolic Panel w/Mg & Inorg Phos: AM Sched. Collection: 09-Jul-2019 04:00 (07-08-19 @ 07:29)  Complete Blood Count: STAT (07-08-19 @ 08:17)  Basic Metabolic Panel w/Mg & Inorg Phos: STAT (07-08-19 @ 08:17)  POCT  Blood Glucose (07-08-19 @ 08:40)      Allergies    No Known Allergies    Intolerances        REVIEW OF SYSTEMS:  CARDIOVASCULAR: No chest pain, palpitations, dizziness, or leg swelling; no shortness of breath     RESPIRATORY: No cough, wheezing, chills or hemoptysis; No shortness of breath    GASTROINTESTINAL: No abdominal or epigastric pain. No nausea, vomiting, or hematemesis; No diarrhea or constipation. No melena or hematochezia.    NEUROLOGICAL: No headaches, memory loss, loss of strength, numbness      PHYSICAL EXAM:  Vital Signs Last 24 Hrs  T(C): 36.5 (08 Jul 2019 05:50), Max: 36.6 (07 Jul 2019 18:41)  T(F): 97.7 (08 Jul 2019 05:50), Max: 97.8 (07 Jul 2019 18:41)  HR: 87 (08 Jul 2019 05:50) (82 - 87)  BP: 118/52 (08 Jul 2019 05:50) (118/52 - 147/60)  BP(mean): --  RR: 17 (08 Jul 2019 05:50) (17 - 18)  SpO2: 100% (08 Jul 2019 05:50) (98% - 100%)    GENERAL: NAD, well-groomed, well-developed  HEAD:  Atraumatic, Normocephalic  EYES: EOMI, PERRLA, conjunctiva and sclera clear  NECK: Supple, No JVD, Normal thyroid  NERVOUS SYSTEM:  Alert & Oriented X3, Good concentration;  and symmetric  CHEST/LUNG: Clear to auscultation bilaterally; No rales, rhonchi, wheezing, or rubs  HEART: S1S2 irregularly irregular, without murmur, rub nor gallop  ABDOMEN: Soft, Nontender, Nondistended; Bowel sounds present  EXTREMITIES:  2+ Peripheral Pulses, No clubbing, cyanosis, or edema      LABS:      Ca    9.4        07 Jul 2019 05:59        CAPILLARY BLOOD GLUCOSE      POCT Blood Glucose.: 156 mg/dL (08 Jul 2019 08:39)  POCT Blood Glucose.: 139 mg/dL (07 Jul 2019 22:20)  POCT Blood Glucose.: 248 mg/dL (07 Jul 2019 17:36)    < from: Transthoracic Echocardiogram (07.07.19 @ 13:16) >  CONCLUSIONS:  1. Mitral annular calcification and calcified mitral  leaflets with normal diastolic opening. Mild-moderate  mitral regurgitation. Mean transmitral valve gradient  equals 4 mm Hg, consistent with mild mitral stenosis.  2. Calcified trileaflet aortic valve with normal opening.  3. Severely dilated left atrium.  LA volume index = 70  cc/m2.  4. Mild to moderate global left ventricular systolic  dysfunction.  5. Right atrial enlargement.  6. Normal right ventricular size and function.  7. Normal tricuspid valve. Severe tricuspid regurgitation.  8. Estimated pulmonary artery systolic pressure equals 85  mm Hg, assuming right atrial pressure equals 10  mm Hg,  consistent with severe pulmonary hypertension.    < end of copied text >        RADIOLOGY & ADDITIONAL TESTS:    Imaging Personally Reviewed:  [ ] YES     Consultant(s) Notes Reviewed:        assessment: MS/  MR, afib chf; pulm htn unchanged from 2014    Plan:   change lasix to po.  d/c planning with home care

## 2019-07-08 NOTE — DIETITIAN INITIAL EVALUATION ADULT. - PROBLEM SELECTOR PLAN 1
HsT on admission was 53 with repeat 58. EKG with no significant dynamic ischemic changes. Patient denied any chest pain. Likely troponin leak in the setting of underline CHF   Will monitor on telemetry, serial EKG and Mauro prn for any episodes of chest pain   HgbA1C, TSH, lipid profile, CBC, CMP in am   TTE ordered   Will trend 1 more set of cardiac enzyme to check troponin level   Continue with Aspirin 81mg daily  Patient was given Lovenox 1mg/kg in the ED for anticoagulation. Will continue with Pradaxa for now as per attending' s request

## 2019-07-08 NOTE — DIETITIAN INITIAL EVALUATION ADULT. - PROBLEM SELECTOR PLAN 3
[id number] Score of 8 and patient on Pradaxa at home for anticoagulation. Patient s/p Lovenox 1mg/kg in the ED for NSTEMI. Will continue with Pradaxa for now as per attending' s request.   Continue with Metoprolol and Digoxin for rate control   Digoxin level with morning labs

## 2019-07-08 NOTE — DIETITIAN INITIAL EVALUATION ADULT. - PROBLEM SELECTOR PLAN 8
Already therapeutic as patient was given Lovenox 1mg/kg in the ED for NSTEMI. Started back on Pradaxa as per attendings request

## 2019-07-08 NOTE — DISCHARGE NOTE PROVIDER - NSDCFUADDAPPT_GEN_ALL_CORE_FT
Please follow up with your PCP in 1-2 weeks. Please follow up with your PCP in 1-2 weeks.  Please follow up with Dr. Wolfe on 7/22 as scheduled.

## 2019-07-08 NOTE — DISCHARGE NOTE NURSING/CASE MANAGEMENT/SOCIAL WORK - NSSCNAMETXT_GEN_ALL_CORE
[facility]

## 2019-07-10 ENCOUNTER — APPOINTMENT (OUTPATIENT)
Age: 84
End: 2019-07-10

## 2019-07-10 RX ORDER — MEMANTINE HYDROCHLORIDE 10 MG/1
10 TABLET, FILM COATED ORAL TWICE DAILY
Refills: 0 | Status: ACTIVE | COMMUNITY
Start: 2019-07-10

## 2019-07-10 RX ORDER — LISINOPRIL 2.5 MG/1
2.5 TABLET ORAL DAILY
Refills: 0 | Status: DISCONTINUED | COMMUNITY
End: 2019-07-10

## 2019-07-10 RX ORDER — FUROSEMIDE 40 MG/1
40 TABLET ORAL TWICE DAILY
Refills: 0 | Status: ACTIVE | COMMUNITY
Start: 2019-07-10

## 2019-07-12 ENCOUNTER — APPOINTMENT (OUTPATIENT)
Age: 84
End: 2019-07-12

## 2019-07-12 ENCOUNTER — APPOINTMENT (OUTPATIENT)
Age: 84
End: 2019-07-12
Payer: MEDICARE

## 2019-07-12 VITALS
OXYGEN SATURATION: 98 % | HEART RATE: 80 BPM | RESPIRATION RATE: 16 BRPM | DIASTOLIC BLOOD PRESSURE: 50 MMHG | SYSTOLIC BLOOD PRESSURE: 120 MMHG

## 2019-07-12 PROCEDURE — 99496 TRANSJ CARE MGMT HIGH F2F 7D: CPT

## 2019-07-12 NOTE — PHYSICAL EXAM
[No Acute Distress] : no acute distress [Well Developed] : well developed [PERRL] : pupils equal round and reactive to light [EOMI] : extraocular movements intact [No JVD] : no jugular venous distention [Supple] : supple [No Respiratory Distress] : no respiratory distress  [No Accessory Muscle Use] : no accessory muscle use [Clear to Auscultation] : lungs were clear to auscultation bilaterally [Normal Rate] : normal rate  [Regular Rhythm] : with a regular rhythm [Normal S1, S2] : normal S1 and S2 [No Murmur] : no murmur heard [Pedal Pulses Present] : the pedal pulses are present [Soft] : abdomen soft [Non Tender] : non-tender [Non-distended] : non-distended [Normal Bowel Sounds] : normal bowel sounds [No Rash] : no rash [No Focal Deficits] : no focal deficits [de-identified] : +1 b/l le edema [de-identified] : A&O x 2

## 2019-07-12 NOTE — COUNSELING
[Needs reinforcement, provided] : Patient needs reinforcement on understanding of disease, goals and obesity follow-up plan; reinforcement was provided [Low Salt Diet] : Low salt diet

## 2019-07-12 NOTE — HISTORY OF PRESENT ILLNESS
[Formal Caregiver] : formal caregiver [FreeTextEntry1] : "Hospital follow up for CHF exacerbation" Pt is A&O x 2, homebound due to cognitive impairment, limited endurance. Seen in adult residence with HA present.  Hospitalized for worsening LE edema and discharged home on increased dose of lasix. Pt is poor historian, per MCCULLOUGH present pt denies c/o SOB, chest pain, palpitations, dizziness, headache. Has + LE edema, improved since d/c. Weights stable and unchanged since d/c home. Meals prepared by adult home tid. Discussed diet and meals for pt- per staff pt pleasantly confused, meals cooked without salt however will remind patient not to use salt shakers provided at resident's table.  TCM card and contact provided to patient and caregiver Luisa  and program reinforced.Homecare SOC completed for close RN assessment and education.  [de-identified] : Hospital Course: 84 y/o F with PMH of DVT, Afib(on Pradaxa), DM type II, HTN, HLD, CHF(with EF of 60%), PPM presented with the complaint of worsening bilateral LE swelling for the past few days. \par 1. NSTEMI\par -Patient s/p Lovenox in the ED and now back on Dabigatran.\par 2. Acute on chronic diastolic CHF\par -Patient was on lasix 40 IV BID and will go home Lasix PO 40mg twice a day.\par LE dopplers negative for DVT\par 7/7 echo- Mitral annular calcification and calcified mitral leaflets with normal diastolic opening. Mild-moderate mitral regurgitation. Mean transmitral valve gradient\par equals 4 mm Hg, consistent with mild mitral stenosis. Calcified trileaflet aortic valve with normal opening. Severely dilated left atrium. LA volume index = 70\par cc/m2. Mild to moderate global left ventricular systolic dysfunction. Right atrial enlargement. Normal right ventricular size and function. Normal tricuspid valve. Severe tricuspid regurgitation. Estimated pulmonary artery systolic pressure equals 85\par mm Hg, assuming right atrial pressure equals 10 mm Hg,consistent with severe pulmonary hypertension.\par 3. PPM placed in 2014\par - [employer] PPM Model #K174\par - PPM was interrogated by EP - has episodes of NSVT on tele but pt is already on BB and not due for an upgrade at this time. Pt will follow up with Dr. Wolfe on 7/22 as scheduled.

## 2019-07-20 LAB
ALBUMIN SERPL ELPH-MCNC: 3.6 G/DL
ALP BLD-CCNC: 95 U/L
ALT SERPL-CCNC: 11 U/L
ANION GAP SERPL CALC-SCNC: 18 MMOL/L
AST SERPL-CCNC: 20 U/L
BILIRUB SERPL-MCNC: 0.2 MG/DL
BUN SERPL-MCNC: 16 MG/DL
CALCIUM SERPL-MCNC: 8.8 MG/DL
CHLORIDE SERPL-SCNC: 101 MMOL/L
CO2 SERPL-SCNC: 23 MMOL/L
CREAT SERPL-MCNC: 0.91 MG/DL
GLUCOSE SERPL-MCNC: 229 MG/DL
MAGNESIUM SERPL-MCNC: 2.1 MG/DL
NT-PROBNP SERPL-MCNC: 4602 PG/ML
POTASSIUM SERPL-SCNC: 4 MMOL/L
PROT SERPL-MCNC: 6.4 G/DL
SODIUM SERPL-SCNC: 142 MMOL/L

## 2019-07-22 ENCOUNTER — APPOINTMENT (OUTPATIENT)
Dept: ELECTROPHYSIOLOGY | Facility: CLINIC | Age: 84
End: 2019-07-22
Payer: MEDICARE

## 2019-07-22 VITALS
OXYGEN SATURATION: 9 % | HEART RATE: 82 BPM | SYSTOLIC BLOOD PRESSURE: 133 MMHG | RESPIRATION RATE: 14 BRPM | HEIGHT: 64 IN | SYSTOLIC BLOOD PRESSURE: 134 MMHG | DIASTOLIC BLOOD PRESSURE: 64 MMHG | WEIGHT: 141 LBS | BODY MASS INDEX: 24.07 KG/M2 | DIASTOLIC BLOOD PRESSURE: 58 MMHG | HEART RATE: 73 BPM

## 2019-07-22 DIAGNOSIS — I48.1 PERSISTENT ATRIAL FIBRILLATION: ICD-10-CM

## 2019-07-22 DIAGNOSIS — I49.5 SICK SINUS SYNDROME: ICD-10-CM

## 2019-07-22 DIAGNOSIS — I50.9 HEART FAILURE, UNSPECIFIED: ICD-10-CM

## 2019-07-22 PROCEDURE — 93280 PM DEVICE PROGR EVAL DUAL: CPT

## 2019-07-22 PROCEDURE — 99214 OFFICE O/P EST MOD 30 MIN: CPT

## 2019-07-22 PROCEDURE — 93000 ELECTROCARDIOGRAM COMPLETE: CPT | Mod: 59

## 2019-07-22 RX ORDER — EMPAGLIFLOZIN 25 MG/1
25 TABLET, FILM COATED ORAL
Refills: 0 | Status: ACTIVE | COMMUNITY

## 2019-07-22 NOTE — PHYSICAL EXAM
[General Appearance - Well Developed] : well developed [Well Groomed] : well groomed [Normal Appearance] : normal appearance [No Deformities] : no deformities [General Appearance - Well Nourished] : well nourished [General Appearance - In No Acute Distress] : no acute distress [Eyelids - No Xanthelasma] : the eyelids demonstrated no xanthelasmas [Normal Conjunctiva] : the conjunctiva exhibited no abnormalities [Normal Oral Mucosa] : normal oral mucosa [No Oral Pallor] : no oral pallor [No Oral Cyanosis] : no oral cyanosis [Normal Jugular Venous V Waves Present] : normal jugular venous V waves present [Normal Jugular Venous A Waves Present] : normal jugular venous A waves present [No Jugular Venous Madrigal A Waves] : no jugular venous madrigal A waves [Respiration, Rhythm And Depth] : normal respiratory rhythm and effort [Exaggerated Use Of Accessory Muscles For Inspiration] : no accessory muscle use [Heart Rate And Rhythm] : heart rate and rhythm were normal [Heart Sounds] : normal S1 and S2 [Abdomen Soft] : soft [Abdomen Tenderness] : non-tender [Abdomen Mass (___ Cm)] : no abdominal mass palpated [Abnormal Walk] : normal gait [Nail Clubbing] : no clubbing of the fingernails [Gait - Sufficient For Exercise Testing] : the gait was sufficient for exercise testing [Petechial Hemorrhages (___cm)] : no petechial hemorrhages [Cyanosis, Localized] : no localized cyanosis [Skin Color & Pigmentation] : normal skin color and pigmentation [] : no rash [Skin Lesions] : no skin lesions [No Venous Stasis] : no venous stasis [No Xanthoma] : no  xanthoma was observed [No Skin Ulcers] : no skin ulcer [Oriented To Time, Place, And Person] : oriented to person, place, and time [Affect] : the affect was normal [Mood] : the mood was normal [No Anxiety] : not feeling anxious [FreeTextEntry1] : +2 B/L LE edema

## 2019-07-22 NOTE — HISTORY OF PRESENT ILLNESS
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 Was recently hospitalized earlier in July for acute on chronic CHF exacerbation. Was brought into the hospital by her son who noted increased weight gain and swelling. Admits doing well with no issues or complaints. Notes weight still trending up. Recently seen by Cardiologist, on furosemide 40mg BID and had been on spironolactone with consideration for possible HCTZ addition. Denies chest pain, palpitations, SOB, syncope or near syncope.

## 2019-07-22 NOTE — DISCUSSION/SUMMARY
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 Device check performed today revealed device in good working status with adequate pacing/sensing thresholds. Multiple episodes of NSVT noted correlating with times of hospitalizations for acute on chronic CHF exacerbation. \par \par 2. HTN: HTN: resume oral antihypertensives as prescribed. Encouraged heart healthy diet, sodium restriction, and weight loss. Continue regular f/u with Cardiologist for further HTN management.\par \par 3. Chronic systolic CHF: multiple hospitalization for acute on chronic CHF exacerbation. Recently seen by Cardiologist. Adjusting diuretics as tolerated. Continue daily weights and regular f/u with Cardiology for continued management. Instructed on importance of heart healthy diet and sodium restriction. \par \par 4. NSVT: multiple episodes of NSVT noted on device, likely in setting of acute on chronic CHF exacerbation. Resume beta blocker therapy and diuretics as prescribed. Will need improved fluid management. Will discuss with Cardiologist. \par \par Continue f/u with Cardiologist and RTO for f/u in 6 months.\par

## 2019-07-26 ENCOUNTER — APPOINTMENT (OUTPATIENT)
Age: 84
End: 2019-07-26
Payer: MEDICARE

## 2019-07-26 VITALS
SYSTOLIC BLOOD PRESSURE: 110 MMHG | HEIGHT: 64 IN | DIASTOLIC BLOOD PRESSURE: 50 MMHG | BODY MASS INDEX: 24.31 KG/M2 | WEIGHT: 142.4 LBS | RESPIRATION RATE: 16 BRPM

## 2019-07-26 VITALS — HEART RATE: 68 BPM

## 2019-07-26 DIAGNOSIS — F03.90 UNSPECIFIED DEMENTIA W/OUT BEHAVIORAL DISTURBANCE: ICD-10-CM

## 2019-07-26 PROCEDURE — 99496 TRANSJ CARE MGMT HIGH F2F 7D: CPT

## 2019-07-27 PROBLEM — F03.90 DEMENTIA: Status: ACTIVE | Noted: 2019-07-12

## 2019-07-27 RX ORDER — SACUBITRIL AND VALSARTAN 24; 26 MG/1; MG/1
24-26 TABLET, FILM COATED ORAL TWICE DAILY
Refills: 0 | Status: ACTIVE | COMMUNITY
Start: 2019-07-26

## 2019-07-27 NOTE — HISTORY OF PRESENT ILLNESS
[Formal Caregiver] : formal caregiver [FreeTextEntry1] : "LE edema and weight gain" Pt is A&O x 1-2 , homebound due to limited endurance, weakness.  Reported by HA and homecare RN that pt continues with worsening weight gain. Noncompliant to recommended diet ^ cognitive impairment. Continues to increase in weight and LE edema- 142.4 lbs .  Per homecare RN, pt was started on entresto by cardiology this week.  Denies SOB, dizziness, orthopnea.  Attempted to contact Cardiology- office is close and not accepting messages for f/u. Message left for PCP Jus in [facility] office. Plan to increase lasix 40 mg in afternoon, con 80 mg in morning. Message left for son Jus and daughter Theodora. Reinforced TCM ongoing follow up and close PCP/cardiology eval.  [de-identified] : Hospital Course: 82 y/o F with PMH of DVT, Afib(on Pradaxa), DM type II, HTN, HLD, CHF(with EF of 60%), PPM presented with the complaint of worsening bilateral LE swelling for the past few days. \par 1. NSTEMI\par -Patient s/p Lovenox in the ED and now back on Dabigatran.\par 2. Acute on chronic diastolic CHF\par -Patient was on lasix 40 IV BID and will go home Lasix PO 40mg twice a day.\par LE dopplers negative for DVT\par 7/7 echo- Mitral annular calcification and calcified mitral leaflets with normal diastolic opening. Mild-moderate mitral regurgitation. Mean transmitral valve gradient\par equals 4 mm Hg, consistent with mild mitral stenosis. Calcified trileaflet aortic valve with normal opening. Severely dilated left atrium. LA volume index = 70\par cc/m2. Mild to moderate global left ventricular systolic dysfunction. Right atrial enlargement. Normal right ventricular size and function. Normal tricuspid valve. Severe tricuspid regurgitation. Estimated pulmonary artery systolic pressure equals 85\par mm Hg, assuming right atrial pressure equals 10 mm Hg,consistent with severe pulmonary hypertension.\par 3. PPM placed in 2014\par - [employer] PPM Model #K174\par - PPM was interrogated by EP - has episodes of NSVT on tele but pt is already on BB and not due for an upgrade at this time. Pt will follow up with Dr. Wolfe on 7/22 as scheduled.

## 2019-07-27 NOTE — PHYSICAL EXAM
[No Acute Distress] : no acute distress [Well Developed] : well developed [PERRL] : pupils equal round and reactive to light [EOMI] : extraocular movements intact [No JVD] : no jugular venous distention [Supple] : supple [No Respiratory Distress] : no respiratory distress  [Clear to Auscultation] : lungs were clear to auscultation bilaterally [No Accessory Muscle Use] : no accessory muscle use [Regular Rhythm] : with a regular rhythm [Normal Rate] : normal rate  [Normal S1, S2] : normal S1 and S2 [Pedal Pulses Present] : the pedal pulses are present [No Murmur] : no murmur heard [Non Tender] : non-tender [Soft] : abdomen soft [Non-distended] : non-distended [No Rash] : no rash [Normal Bowel Sounds] : normal bowel sounds [No Focal Deficits] : no focal deficits [de-identified] : +3 b/l le edema  [de-identified] : A&O x 2

## 2019-07-28 ENCOUNTER — NON-APPOINTMENT (OUTPATIENT)
Age: 84
End: 2019-07-28

## 2019-08-05 RX ORDER — METOLAZONE 2.5 MG/1
2.5 TABLET ORAL DAILY
Refills: 0 | Status: ACTIVE | COMMUNITY
Start: 2019-07-29

## 2019-10-24 ENCOUNTER — APPOINTMENT (OUTPATIENT)
Dept: ELECTROPHYSIOLOGY | Facility: CLINIC | Age: 84
End: 2019-10-24
Payer: MEDICARE

## 2019-10-24 PROCEDURE — 93294 REM INTERROG EVL PM/LDLS PM: CPT

## 2019-10-24 PROCEDURE — 93296 REM INTERROG EVL PM/IDS: CPT

## 2019-11-04 ENCOUNTER — NON-APPOINTMENT (OUTPATIENT)
Age: 84
End: 2019-11-04

## 2019-11-04 ENCOUNTER — APPOINTMENT (OUTPATIENT)
Dept: ELECTROPHYSIOLOGY | Facility: CLINIC | Age: 84
End: 2019-11-04
Payer: MEDICARE

## 2019-11-04 VITALS
BODY MASS INDEX: 22.53 KG/M2 | WEIGHT: 132 LBS | DIASTOLIC BLOOD PRESSURE: 73 MMHG | HEIGHT: 64 IN | OXYGEN SATURATION: 98 % | SYSTOLIC BLOOD PRESSURE: 127 MMHG | HEART RATE: 92 BPM

## 2019-11-04 PROCEDURE — 93000 ELECTROCARDIOGRAM COMPLETE: CPT

## 2019-11-04 PROCEDURE — 99214 OFFICE O/P EST MOD 30 MIN: CPT

## 2019-11-10 NOTE — PHYSICAL EXAM
[Normal Appearance] : normal appearance [General Appearance - In No Acute Distress] : no acute distress [Auscultation Breath Sounds / Voice Sounds] : lungs were clear to auscultation bilaterally [Heart Rate And Rhythm] : heart rate and rhythm were normal [Heart Sounds] : normal S1 and S2 [Bowel Sounds] : normal bowel sounds [Abdomen Soft] : soft [Skin Color & Pigmentation] : normal skin color and pigmentation [Oriented to Person] : oriented to person [Oriented to Place] : oriented to place [FreeTextEntry1] : ambulates with rolling walker [Oriented to Time] : disoriented to time

## 2019-11-10 NOTE — REASON FOR VISIT
[Follow-Up - Clinic] : a clinic follow-up of [Cardiomyopathy] : cardiomyopathy [FreeTextEntry1] : NSVT

## 2019-11-10 NOTE — HISTORY OF PRESENT ILLNESS
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 \par DTR and patient state no changes to medication regimen.

## 2019-11-10 NOTE — DISCUSSION/SUMMARY
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  Patient and DTR deny symptoms associated or noted that day.\par \par Impression:\par \par 1. Ventricular tachycardia\par Asymptomatic\par Continued beta blocker, can increase dosage and discussed with primary cardio Dr. Hagan regarding ischemic eval.\par \par 2. HTN: resume oral antihypertensives as prescribed. Encouraged heart healthy diet, sodium restriction, and weight loss. Continue regular f/u with Cardiologist for further HTN management.\par \par 3. Chronic systolic CHF: Continue daily weights and regular f/u with Cardiology for continued management. Instructed on importance of heart healthy diet and sodium restriction. \par \par \par

## 2020-01-27 ENCOUNTER — APPOINTMENT (OUTPATIENT)
Dept: ELECTROPHYSIOLOGY | Facility: CLINIC | Age: 85
End: 2020-01-27
Payer: MEDICARE

## 2020-01-27 VITALS — DIASTOLIC BLOOD PRESSURE: 45 MMHG | HEART RATE: 60 BPM | SYSTOLIC BLOOD PRESSURE: 113 MMHG

## 2020-01-27 VITALS — HEIGHT: 64 IN | OXYGEN SATURATION: 99 % | WEIGHT: 140 LBS | BODY MASS INDEX: 23.9 KG/M2

## 2020-01-27 DIAGNOSIS — I10 ESSENTIAL (PRIMARY) HYPERTENSION: ICD-10-CM

## 2020-01-27 DIAGNOSIS — I49.5 SICK SINUS SYNDROME: ICD-10-CM

## 2020-01-27 DIAGNOSIS — Z95.0 PRESENCE OF CARDIAC PACEMAKER: ICD-10-CM

## 2020-01-27 DIAGNOSIS — I50.22 CHRONIC SYSTOLIC (CONGESTIVE) HEART FAILURE: ICD-10-CM

## 2020-01-27 DIAGNOSIS — I47.2 VENTRICULAR TACHYCARDIA: ICD-10-CM

## 2020-01-27 PROCEDURE — 93280 PM DEVICE PROGR EVAL DUAL: CPT

## 2020-01-27 PROCEDURE — 99213 OFFICE O/P EST LOW 20 MIN: CPT

## 2020-01-27 PROCEDURE — 93000 ELECTROCARDIOGRAM COMPLETE: CPT | Mod: 59

## 2020-01-27 NOTE — PHYSICAL EXAM
[General Appearance - Well Developed] : well developed [Normal Appearance] : normal appearance [Well Groomed] : well groomed [General Appearance - Well Nourished] : well nourished [No Deformities] : no deformities [General Appearance - In No Acute Distress] : no acute distress [Normal Conjunctiva] : the conjunctiva exhibited no abnormalities [Eyelids - No Xanthelasma] : the eyelids demonstrated no xanthelasmas [Normal Oral Mucosa] : normal oral mucosa [No Oral Pallor] : no oral pallor [No Oral Cyanosis] : no oral cyanosis [Normal Jugular Venous A Waves Present] : normal jugular venous A waves present [Normal Jugular Venous V Waves Present] : normal jugular venous V waves present [No Jugular Venous Madrigal A Waves] : no jugular venous madrigal A waves [Respiration, Rhythm And Depth] : normal respiratory rhythm and effort [Exaggerated Use Of Accessory Muscles For Inspiration] : no accessory muscle use [Heart Rate And Rhythm] : heart rate and rhythm were normal [Heart Sounds] : normal S1 and S2 [Abdomen Soft] : soft [Abdomen Tenderness] : non-tender [Abdomen Mass (___ Cm)] : no abdominal mass palpated [Abnormal Walk] : normal gait [Gait - Sufficient For Exercise Testing] : the gait was sufficient for exercise testing [Nail Clubbing] : no clubbing of the fingernails [Cyanosis, Localized] : no localized cyanosis [Petechial Hemorrhages (___cm)] : no petechial hemorrhages [Skin Color & Pigmentation] : normal skin color and pigmentation [] : no rash [No Venous Stasis] : no venous stasis [Skin Lesions] : no skin lesions [No Skin Ulcers] : no skin ulcer [No Xanthoma] : no  xanthoma was observed [Oriented To Time, Place, And Person] : oriented to person, place, and time [Affect] : the affect was normal [Mood] : the mood was normal [No Anxiety] : not feeling anxious [FreeTextEntry1] : +2 B/L LE edema

## 2020-01-27 NOTE — DISCUSSION/SUMMARY
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 Device check performed today revealed device in good working status with adequate pacing/sensing thresholds. Countine routine f/u in device as scheduled. \par \par 2. HTN: resume oral antihypertensives as prescribed. Encouraged heart healthy diet, sodium restriction, and weight loss. Continue regular f/u with Cardiologist for further HTN management.\par \par 3. Chronic systolic CHF: multiple hospitalization for acute on chronic CHF exacerbation. Recently seen by Cardiologist. Adjusting diuretics as tolerated. Continue daily weights and regular f/u with Cardiology for continued management. Instructed on importance of heart healthy diet and sodium restriction. \par \par 4. NSVT: multiple episodes of NSVT noted on device, likely in setting of acute on chronic CHF exacerbation. Resume beta blocker therapy and diuretics as prescribed. Will discuss with Cardiologist. \par \par Will continue f/u with Cardiologist and may RTO as needed or if any new or worsening symptoms or findings occur.\par

## 2020-02-02 ENCOUNTER — NON-APPOINTMENT (OUTPATIENT)
Age: 85
End: 2020-02-02

## 2020-03-23 NOTE — DISCHARGE NOTE PROVIDER - NSDCHHATTENDCERT_GEN_ALL_CORE
normal... My signature below certifies that the above stated patient is homebound and upon completion of the Face-To-Face encounter, has the need for intermittent skilled nursing, physical therapy and/or speech or occupational therapy services in their home for their current diagnosis as outlined in their initial plan of care. These services will continue to be monitored by myself or another physician.

## 2020-04-28 ENCOUNTER — APPOINTMENT (OUTPATIENT)
Dept: ELECTROPHYSIOLOGY | Facility: CLINIC | Age: 85
End: 2020-04-28
Payer: MEDICARE

## 2020-04-28 PROCEDURE — 93294 REM INTERROG EVL PM/LDLS PM: CPT

## 2020-04-28 PROCEDURE — 93296 REM INTERROG EVL PM/IDS: CPT

## 2020-07-30 ENCOUNTER — APPOINTMENT (OUTPATIENT)
Dept: ELECTROPHYSIOLOGY | Facility: CLINIC | Age: 85
End: 2020-07-30
Payer: MEDICARE

## 2020-07-30 PROCEDURE — 93296 REM INTERROG EVL PM/IDS: CPT

## 2020-07-30 PROCEDURE — 93294 REM INTERROG EVL PM/LDLS PM: CPT

## 2020-10-29 ENCOUNTER — APPOINTMENT (OUTPATIENT)
Dept: ELECTROPHYSIOLOGY | Facility: CLINIC | Age: 85
End: 2020-10-29
Payer: MEDICARE

## 2020-10-29 PROCEDURE — 93296 REM INTERROG EVL PM/IDS: CPT

## 2020-10-29 PROCEDURE — 93294 REM INTERROG EVL PM/LDLS PM: CPT

## 2020-11-17 NOTE — ED PROVIDER NOTE - CONSTITUTIONAL NEGATIVE STATEMENT, MLM
Normal results, add to prenatal records. We can review in detail with patient at next visit. no fever and no chills.

## 2021-06-06 ENCOUNTER — FORM ENCOUNTER (OUTPATIENT)
Age: 86
End: 2021-06-06

## 2021-07-29 ENCOUNTER — APPOINTMENT (OUTPATIENT)
Dept: ELECTROPHYSIOLOGY | Facility: CLINIC | Age: 86
End: 2021-07-29

## 2021-09-14 ENCOUNTER — FORM ENCOUNTER (OUTPATIENT)
Age: 86
End: 2021-09-14

## 2021-10-28 ENCOUNTER — APPOINTMENT (OUTPATIENT)
Dept: ELECTROPHYSIOLOGY | Facility: CLINIC | Age: 86
End: 2021-10-28

## 2023-01-31 NOTE — DIETITIAN INITIAL EVALUATION ADULT. - PHYSICAL APPEARANCE
DOS: 2/1/2023.   Patient to report to [facility].   Pre-procedure interview was completed. Patient was instructed to no medications on the day of surgery, will bring list for review.    Spouse, Rafal ride/caregiver.     Patient was informed of current visitation policies due to coronavirus precautions.  Visitors will wait in the patient's room during the patient's procedure.  Patient/visitor's are required to wear a mask.  Patient also advised that  parking is not available at this time.   well nourished

## 2023-08-17 NOTE — PROCEDURE NOTE - NSTIMEOUT_GEN_A_CORE
Recent Labs     08/15/23  0502 08/16/23  0507 08/17/23  0450   CREATININE 1.73* 1.69* 1.33*   EGFR 26 27 36     Estimated Creatinine Clearance: 30.5 mL/min (A) (by C-G formula based on SCr of 1.33 mg/dL (H)).     CKD Stage 3B/4    · Creatinine 2.26; baseline around 1-1.1  · Patient reports poor oral intake; suspecting prerenal dehydration as cause  · Denies flank/abd pain   · Additional day of IVF hydration gentle  · Avoid nephrotoxic agents   · AM BMP Patient's first and last name, , procedure, and correct site confirmed prior to the start of procedure.